# Patient Record
Sex: FEMALE | Race: WHITE | NOT HISPANIC OR LATINO | URBAN - METROPOLITAN AREA
[De-identification: names, ages, dates, MRNs, and addresses within clinical notes are randomized per-mention and may not be internally consistent; named-entity substitution may affect disease eponyms.]

---

## 2024-10-24 ENCOUNTER — HOSPITAL ENCOUNTER (EMERGENCY)
Facility: HOSPITAL | Age: 49
Discharge: OTHER INSTITUTION | End: 2024-10-24
Attending: STUDENT IN AN ORGANIZED HEALTH CARE EDUCATION/TRAINING PROGRAM
Payer: COMMERCIAL

## 2024-10-24 ENCOUNTER — APPOINTMENT (OUTPATIENT)
Dept: CARDIOLOGY | Facility: HOSPITAL | Age: 49
End: 2024-10-24
Payer: COMMERCIAL

## 2024-10-24 ENCOUNTER — HOSPITAL ENCOUNTER (INPATIENT)
Facility: HOSPITAL | Age: 49
LOS: 1 days | Discharge: HOME | End: 2024-10-25
Attending: PSYCHIATRY & NEUROLOGY | Admitting: PSYCHIATRY & NEUROLOGY
Payer: COMMERCIAL

## 2024-10-24 VITALS
HEART RATE: 97 BPM | OXYGEN SATURATION: 100 % | HEIGHT: 66 IN | SYSTOLIC BLOOD PRESSURE: 139 MMHG | TEMPERATURE: 98.4 F | DIASTOLIC BLOOD PRESSURE: 98 MMHG | WEIGHT: 200 LBS | BODY MASS INDEX: 32.14 KG/M2 | RESPIRATION RATE: 15 BRPM

## 2024-10-24 DIAGNOSIS — F22 PSYCHOSIS, PARANOID (MULTI): ICD-10-CM

## 2024-10-24 DIAGNOSIS — F31.9 BIPOLAR 1 DISORDER (MULTI): ICD-10-CM

## 2024-10-24 DIAGNOSIS — F22 PARANOID DELUSION (MULTI): Primary | ICD-10-CM

## 2024-10-24 DIAGNOSIS — R44.3 HALLUCINATIONS: ICD-10-CM

## 2024-10-24 DIAGNOSIS — N30.00 ACUTE CYSTITIS WITHOUT HEMATURIA: Primary | ICD-10-CM

## 2024-10-24 LAB
ALBUMIN SERPL BCP-MCNC: 3.8 G/DL (ref 3.4–5)
ALP SERPL-CCNC: 161 U/L (ref 33–110)
ALT SERPL W P-5'-P-CCNC: 21 U/L (ref 7–45)
AMPHETAMINES UR QL SCN: ABNORMAL
ANION GAP SERPL CALCULATED.3IONS-SCNC: 11 MMOL/L (ref 10–20)
APAP SERPL-MCNC: <10 UG/ML
APPEARANCE UR: ABNORMAL
AST SERPL W P-5'-P-CCNC: 18 U/L (ref 9–39)
B-HCG SERPL-ACNC: <2 MIU/ML
BACTERIA #/AREA URNS AUTO: ABNORMAL /HPF
BARBITURATES UR QL SCN: ABNORMAL
BASOPHILS # BLD AUTO: 0.01 X10*3/UL (ref 0–0.1)
BASOPHILS NFR BLD AUTO: 0.2 %
BENZODIAZ UR QL SCN: ABNORMAL
BILIRUB SERPL-MCNC: 0.5 MG/DL (ref 0–1.2)
BILIRUB UR STRIP.AUTO-MCNC: NEGATIVE MG/DL
BUN SERPL-MCNC: 22 MG/DL (ref 6–23)
BZE UR QL SCN: ABNORMAL
CALCIUM SERPL-MCNC: 9.1 MG/DL (ref 8.6–10.3)
CANNABINOIDS UR QL SCN: ABNORMAL
CHLORIDE SERPL-SCNC: 109 MMOL/L (ref 98–107)
CO2 SERPL-SCNC: 20 MMOL/L (ref 21–32)
COLOR UR: ABNORMAL
CREAT SERPL-MCNC: 1.17 MG/DL (ref 0.5–1.05)
EGFRCR SERPLBLD CKD-EPI 2021: 57 ML/MIN/1.73M*2
EOSINOPHIL # BLD AUTO: 0.02 X10*3/UL (ref 0–0.7)
EOSINOPHIL NFR BLD AUTO: 0.4 %
ERYTHROCYTE [DISTWIDTH] IN BLOOD BY AUTOMATED COUNT: 14.8 % (ref 11.5–14.5)
ETHANOL SERPL-MCNC: <10 MG/DL
FENTANYL+NORFENTANYL UR QL SCN: ABNORMAL
GLUCOSE BLD MANUAL STRIP-MCNC: 162 MG/DL (ref 74–99)
GLUCOSE SERPL-MCNC: 175 MG/DL (ref 74–99)
GLUCOSE UR STRIP.AUTO-MCNC: NORMAL MG/DL
HCT VFR BLD AUTO: 35.2 % (ref 36–46)
HGB BLD-MCNC: 11.5 G/DL (ref 12–16)
IMM GRANULOCYTES # BLD AUTO: 0.01 X10*3/UL (ref 0–0.7)
IMM GRANULOCYTES NFR BLD AUTO: 0.2 % (ref 0–0.9)
KETONES UR STRIP.AUTO-MCNC: NEGATIVE MG/DL
LEUKOCYTE ESTERASE UR QL STRIP.AUTO: ABNORMAL
LYMPHOCYTES # BLD AUTO: 0.85 X10*3/UL (ref 1.2–4.8)
LYMPHOCYTES NFR BLD AUTO: 17.8 %
MCH RBC QN AUTO: 27.9 PG (ref 26–34)
MCHC RBC AUTO-ENTMCNC: 32.7 G/DL (ref 32–36)
MCV RBC AUTO: 85 FL (ref 80–100)
METHADONE UR QL SCN: ABNORMAL
MONOCYTES # BLD AUTO: 0.41 X10*3/UL (ref 0.1–1)
MONOCYTES NFR BLD AUTO: 8.6 %
MUCOUS THREADS #/AREA URNS AUTO: ABNORMAL /LPF
NEUTROPHILS # BLD AUTO: 3.47 X10*3/UL (ref 1.2–7.7)
NEUTROPHILS NFR BLD AUTO: 72.8 %
NITRITE UR QL STRIP.AUTO: NEGATIVE
NRBC BLD-RTO: 0 /100 WBCS (ref 0–0)
OPIATES UR QL SCN: ABNORMAL
OXYCODONE+OXYMORPHONE UR QL SCN: ABNORMAL
PCP UR QL SCN: ABNORMAL
PH UR STRIP.AUTO: 6 [PH]
PLATELET # BLD AUTO: 178 X10*3/UL (ref 150–450)
POTASSIUM SERPL-SCNC: 3.8 MMOL/L (ref 3.5–5.3)
PROT SERPL-MCNC: 7.2 G/DL (ref 6.4–8.2)
PROT UR STRIP.AUTO-MCNC: ABNORMAL MG/DL
RBC # BLD AUTO: 4.12 X10*6/UL (ref 4–5.2)
RBC # UR STRIP.AUTO: ABNORMAL /UL
RBC #/AREA URNS AUTO: >20 /HPF
SALICYLATES SERPL-MCNC: <3 MG/DL
SODIUM SERPL-SCNC: 136 MMOL/L (ref 136–145)
SP GR UR STRIP.AUTO: 1.01
SQUAMOUS #/AREA URNS AUTO: ABNORMAL /HPF
TRICHOMONAS #/AREA UR COMP ASSIST: PRESENT /HPF
UROBILINOGEN UR STRIP.AUTO-MCNC: NORMAL MG/DL
WBC # BLD AUTO: 4.8 X10*3/UL (ref 4.4–11.3)
WBC #/AREA URNS AUTO: >50 /HPF

## 2024-10-24 PROCEDURE — 80053 COMPREHEN METABOLIC PANEL: CPT | Performed by: STUDENT IN AN ORGANIZED HEALTH CARE EDUCATION/TRAINING PROGRAM

## 2024-10-24 PROCEDURE — 80307 DRUG TEST PRSMV CHEM ANLYZR: CPT | Performed by: STUDENT IN AN ORGANIZED HEALTH CARE EDUCATION/TRAINING PROGRAM

## 2024-10-24 PROCEDURE — 81001 URINALYSIS AUTO W/SCOPE: CPT | Mod: 59 | Performed by: STUDENT IN AN ORGANIZED HEALTH CARE EDUCATION/TRAINING PROGRAM

## 2024-10-24 PROCEDURE — 96372 THER/PROPH/DIAG INJ SC/IM: CPT | Performed by: STUDENT IN AN ORGANIZED HEALTH CARE EDUCATION/TRAINING PROGRAM

## 2024-10-24 PROCEDURE — 80320 DRUG SCREEN QUANTALCOHOLS: CPT | Performed by: STUDENT IN AN ORGANIZED HEALTH CARE EDUCATION/TRAINING PROGRAM

## 2024-10-24 PROCEDURE — 93005 ELECTROCARDIOGRAM TRACING: CPT

## 2024-10-24 PROCEDURE — 2500000004 HC RX 250 GENERAL PHARMACY W/ HCPCS (ALT 636 FOR OP/ED): Performed by: STUDENT IN AN ORGANIZED HEALTH CARE EDUCATION/TRAINING PROGRAM

## 2024-10-24 PROCEDURE — 2500000001 HC RX 250 WO HCPCS SELF ADMINISTERED DRUGS (ALT 637 FOR MEDICARE OP): Performed by: INTERNAL MEDICINE

## 2024-10-24 PROCEDURE — 1240000001 HC SEMI-PRIVATE BH ROOM DAILY

## 2024-10-24 PROCEDURE — 82947 ASSAY GLUCOSE BLOOD QUANT: CPT

## 2024-10-24 PROCEDURE — 84702 CHORIONIC GONADOTROPIN TEST: CPT | Performed by: STUDENT IN AN ORGANIZED HEALTH CARE EDUCATION/TRAINING PROGRAM

## 2024-10-24 PROCEDURE — 87186 SC STD MICRODIL/AGAR DIL: CPT | Mod: TRILAB | Performed by: STUDENT IN AN ORGANIZED HEALTH CARE EDUCATION/TRAINING PROGRAM

## 2024-10-24 PROCEDURE — 36415 COLL VENOUS BLD VENIPUNCTURE: CPT | Performed by: STUDENT IN AN ORGANIZED HEALTH CARE EDUCATION/TRAINING PROGRAM

## 2024-10-24 PROCEDURE — 90839 PSYTX CRISIS INITIAL 60 MIN: CPT

## 2024-10-24 PROCEDURE — 85025 COMPLETE CBC W/AUTO DIFF WBC: CPT | Performed by: STUDENT IN AN ORGANIZED HEALTH CARE EDUCATION/TRAINING PROGRAM

## 2024-10-24 PROCEDURE — 99285 EMERGENCY DEPT VISIT HI MDM: CPT | Mod: 25

## 2024-10-24 RX ORDER — GABAPENTIN 300 MG/1
300 CAPSULE ORAL 2 TIMES DAILY
COMMUNITY
End: 2024-10-25 | Stop reason: HOSPADM

## 2024-10-24 RX ORDER — DIPHENHYDRAMINE HCL 50 MG
50 CAPSULE ORAL EVERY 6 HOURS PRN
Status: DISCONTINUED | OUTPATIENT
Start: 2024-10-24 | End: 2024-10-25 | Stop reason: HOSPADM

## 2024-10-24 RX ORDER — IBUPROFEN 200 MG
1 TABLET ORAL DAILY
Status: DISCONTINUED | OUTPATIENT
Start: 2024-12-05 | End: 2024-10-25 | Stop reason: HOSPADM

## 2024-10-24 RX ORDER — CEFUROXIME AXETIL 250 MG/1
250 TABLET ORAL 2 TIMES DAILY
Status: DISCONTINUED | OUTPATIENT
Start: 2024-10-24 | End: 2024-10-24

## 2024-10-24 RX ORDER — IBUPROFEN 600 MG/1
600 TABLET ORAL EVERY 8 HOURS PRN
Status: DISCONTINUED | OUTPATIENT
Start: 2024-10-24 | End: 2024-10-25 | Stop reason: HOSPADM

## 2024-10-24 RX ORDER — OLANZAPINE 10 MG/2ML
5 INJECTION, POWDER, FOR SOLUTION INTRAMUSCULAR EVERY 6 HOURS PRN
Status: DISCONTINUED | OUTPATIENT
Start: 2024-10-24 | End: 2024-10-25 | Stop reason: HOSPADM

## 2024-10-24 RX ORDER — SERTRALINE HYDROCHLORIDE 50 MG/1
1 TABLET, FILM COATED ORAL
COMMUNITY
Start: 2024-07-29 | End: 2024-10-25 | Stop reason: HOSPADM

## 2024-10-24 RX ORDER — DIPHENHYDRAMINE HYDROCHLORIDE 50 MG/ML
50 INJECTION INTRAMUSCULAR; INTRAVENOUS ONCE AS NEEDED
Status: DISCONTINUED | OUTPATIENT
Start: 2024-10-24 | End: 2024-10-25 | Stop reason: HOSPADM

## 2024-10-24 RX ORDER — ALUMINUM HYDROXIDE, MAGNESIUM HYDROXIDE, AND SIMETHICONE 1200; 120; 1200 MG/30ML; MG/30ML; MG/30ML
30 SUSPENSION ORAL EVERY 6 HOURS PRN
Status: DISCONTINUED | OUTPATIENT
Start: 2024-10-24 | End: 2024-10-25 | Stop reason: HOSPADM

## 2024-10-24 RX ORDER — OLANZAPINE 5 MG/1
10 TABLET ORAL EVERY 6 HOURS PRN
Status: DISCONTINUED | OUTPATIENT
Start: 2024-10-24 | End: 2024-10-25 | Stop reason: HOSPADM

## 2024-10-24 RX ORDER — OLANZAPINE 5 MG/1
5 TABLET ORAL EVERY 6 HOURS PRN
Status: DISCONTINUED | OUTPATIENT
Start: 2024-10-24 | End: 2024-10-25 | Stop reason: HOSPADM

## 2024-10-24 RX ORDER — LORAZEPAM 2 MG/ML
2 INJECTION INTRAMUSCULAR EVERY 6 HOURS PRN
Status: DISCONTINUED | OUTPATIENT
Start: 2024-10-24 | End: 2024-10-25 | Stop reason: HOSPADM

## 2024-10-24 RX ORDER — OMEPRAZOLE 40 MG/1
40 CAPSULE, DELAYED RELEASE ORAL
COMMUNITY

## 2024-10-24 RX ORDER — NICOTINE 7MG/24HR
1 PATCH, TRANSDERMAL 24 HOURS TRANSDERMAL DAILY
Status: DISCONTINUED | OUTPATIENT
Start: 2024-12-19 | End: 2024-10-25 | Stop reason: HOSPADM

## 2024-10-24 RX ORDER — MIDAZOLAM HYDROCHLORIDE 5 MG/ML
5 INJECTION INTRAMUSCULAR; INTRAVENOUS AS NEEDED
Status: COMPLETED | OUTPATIENT
Start: 2024-10-24 | End: 2024-10-24

## 2024-10-24 RX ORDER — SPIRONOLACTONE 100 MG/1
1 TABLET, FILM COATED ORAL
COMMUNITY
Start: 2024-07-29

## 2024-10-24 RX ORDER — METRONIDAZOLE 500 MG/1
2000 TABLET ORAL ONCE
Status: COMPLETED | OUTPATIENT
Start: 2024-10-24 | End: 2024-10-24

## 2024-10-24 RX ORDER — BUPROPION HYDROCHLORIDE 100 MG/1
150 TABLET ORAL EVERY MORNING
COMMUNITY
End: 2024-10-25 | Stop reason: HOSPADM

## 2024-10-24 RX ORDER — DROPERIDOL 2.5 MG/ML
5 INJECTION, SOLUTION INTRAMUSCULAR; INTRAVENOUS AS NEEDED
Status: DISCONTINUED | OUTPATIENT
Start: 2024-10-24 | End: 2024-10-24 | Stop reason: HOSPADM

## 2024-10-24 RX ORDER — CEFUROXIME AXETIL 500 MG/1
500 TABLET ORAL 2 TIMES DAILY
Status: DISCONTINUED | OUTPATIENT
Start: 2024-10-24 | End: 2024-10-25 | Stop reason: HOSPADM

## 2024-10-24 RX ORDER — LORAZEPAM 1 MG/1
2 TABLET ORAL EVERY 6 HOURS PRN
Status: DISCONTINUED | OUTPATIENT
Start: 2024-10-24 | End: 2024-10-25 | Stop reason: HOSPADM

## 2024-10-24 RX ORDER — IBUPROFEN 200 MG
1 TABLET ORAL DAILY
Status: DISCONTINUED | OUTPATIENT
Start: 2024-10-24 | End: 2024-10-25 | Stop reason: HOSPADM

## 2024-10-24 RX ORDER — OLANZAPINE 10 MG/2ML
10 INJECTION, POWDER, FOR SOLUTION INTRAMUSCULAR EVERY 6 HOURS PRN
Status: DISCONTINUED | OUTPATIENT
Start: 2024-10-24 | End: 2024-10-25 | Stop reason: HOSPADM

## 2024-10-24 RX ORDER — TALC
3 POWDER (GRAM) TOPICAL NIGHTLY PRN
Status: DISCONTINUED | OUTPATIENT
Start: 2024-10-24 | End: 2024-10-25 | Stop reason: HOSPADM

## 2024-10-24 RX ADMIN — CEFUROXIME AXETIL 500 MG: 500 TABLET ORAL at 22:50

## 2024-10-24 RX ADMIN — METRONIDAZOLE 2000 MG: 500 TABLET ORAL at 22:50

## 2024-10-24 SDOH — HEALTH STABILITY: MENTAL HEALTH: NON-SPECIFIC ACTIVE SUICIDAL THOUGHTS (PAST 1 MONTH): NO

## 2024-10-24 SDOH — HEALTH STABILITY: MENTAL HEALTH: ANXIETY SYMPTOMS: NO PROBLEMS REPORTED OR OBSERVED.

## 2024-10-24 SDOH — HEALTH STABILITY: MENTAL HEALTH: WISH TO BE DEAD (PAST 1 MONTH): NO

## 2024-10-24 SDOH — HEALTH STABILITY: MENTAL HEALTH: IN THE PAST FEW WEEKS, HAVE YOU WISHED YOU WERE DEAD?: NO

## 2024-10-24 SDOH — HEALTH STABILITY: MENTAL HEALTH: ARE YOU HAVING THOUGHTS OF KILLING YOURSELF RIGHT NOW?: NO

## 2024-10-24 SDOH — HEALTH STABILITY: MENTAL HEALTH: HAVE YOU EVER TRIED TO KILL YOURSELF?: NO

## 2024-10-24 SDOH — HEALTH STABILITY: MENTAL HEALTH: IN THE PAST WEEK, HAVE YOU BEEN HAVING THOUGHTS ABOUT KILLING YOURSELF?: NO

## 2024-10-24 SDOH — ECONOMIC STABILITY: HOUSING INSECURITY: FEELS SAFE LIVING IN HOME: YES

## 2024-10-24 SDOH — HEALTH STABILITY: MENTAL HEALTH: DEPRESSION SYMPTOMS: NO PROBLEMS REPORTED OR OBSERVED.

## 2024-10-24 SDOH — HEALTH STABILITY: MENTAL HEALTH: IN THE PAST FEW WEEKS, HAVE YOU FELT THAT YOU OR YOUR FAMILY WOULD BE BETTER OFF IF YOU WERE DEAD?: NO

## 2024-10-24 SDOH — HEALTH STABILITY: MENTAL HEALTH: SUICIDAL BEHAVIOR (LIFETIME): NO

## 2024-10-24 ASSESSMENT — PAIN SCALES - GENERAL
PAINLEVEL_OUTOF10: 0 - NO PAIN
PAINLEVEL_OUTOF10: 0 - NO PAIN

## 2024-10-24 ASSESSMENT — PAIN - FUNCTIONAL ASSESSMENT
PAIN_FUNCTIONAL_ASSESSMENT: 0-10
PAIN_FUNCTIONAL_ASSESSMENT: 0-10

## 2024-10-24 ASSESSMENT — COLUMBIA-SUICIDE SEVERITY RATING SCALE - C-SSRS
1. SINCE LAST CONTACT, HAVE YOU WISHED YOU WERE DEAD OR WISHED YOU COULD GO TO SLEEP AND NOT WAKE UP?: NO
6. HAVE YOU EVER DONE ANYTHING, STARTED TO DO ANYTHING, OR PREPARED TO DO ANYTHING TO END YOUR LIFE?: NO
2. HAVE YOU ACTUALLY HAD ANY THOUGHTS OF KILLING YOURSELF?: NO

## 2024-10-24 ASSESSMENT — LIFESTYLE VARIABLES
SUBSTANCE_ABUSE_PAST_12_MONTHS: YES
PRESCIPTION_ABUSE_PAST_12_MONTHS: NO

## 2024-10-24 NOTE — PROGRESS NOTES
Social Work Note    FTF w/patient for meet and greet.  Pt BIB EMS via pink slip as she called police 3x that evening reporting various   domestic disturbances. Pt appeared to be hallucinating. She was aggressive and could not be redirected by   ED staff. Pt reported no SI/HI and no previous hx of mh issues or inpatient hospitalizations. SW reviewed med   Chart which confirms this.  Pt was medicated due to excessive agitation. When SW encountered pt, she appeared calm   though drowsy. SW quickly explained the ED process and that she would be assessed when medically cleared.   Pt nodded her head expressing understanding and requested additional blankets. SW to follow up during first  Shift.

## 2024-10-24 NOTE — PROGRESS NOTES
"EPAT - Social Work Psychiatric Assessment    Arrival Details  Mode of Arrival:  ([facility])  Admission Source: Home  Admission Type: Involuntary (pink slip by [facility])  EPAT Assessment Start Date: 10/24/24  EPAT Assessment Start Time: 1102  Name of : DIONISIO Gallardo    History of Present Illness  Admission Reason: Pt is a 50y/o female presenting to [facility] ED for hallucinations and paranoia.  HPI: Pt chart, triage and provider notes reviewed prior to assessment, triage assessment reflects \"no risk indicated\". Pt BIB [facility] last night after calling them several times claiming there are people in the walls, ceiling and floors of her home. Pt also believes her phone was hacked. Pt admits to using speed but denies ever having hallucinations as a result of her use. Pt denies SI/HI. Pt is active with providers at [facility] but denies medication use.    SW Readmission Information   Readmission within 30 Days: No    Psychiatric Symptoms  Anxiety Symptoms: No problems reported or observed.  Depression Symptoms: No problems reported or observed.  Bri Symptoms: No problems reported or observed.    Psychosis Symptoms  Hallucination Type: Auditory, Visual  Delusion Type: Paranoid    Additional Symptoms - Adult  Generalized Anxiety Disorder: No problems reported or observed.  Obsessive Compulsive Disorder: No problems reported or observed.  Panic Attack: No problems reported or observed.  Post Traumatic Stress Disorder: No problems reported or observed.  Delirium: No problems reported or observed.    Past Psychiatric History/Meds/Treatments  Past Psychiatric History: Pt reports history of MDD, CLEMENTE, ADHD and prolonged grief. Pt also abusing stimulants for several months.  Past Psychiatric Meds/Treatments: Pt denies medication use. Pt reports seeing a counselor and psychiatrist with [facility]. Pt denies inpatient admissions in the past.  Past Violence/Victimization History: Denies    Current Mental Health Contacts  Case " Manager Name/Phone Number: N/A  Provider Name/Phone Number: Kathryn Garyffer (counselor)    Support System:  (unable to assess)    Living Arrangement: Lives with someone (pt reports living with a man who intends to buy her condo)    Home Safety  Feels Safe Living in Home: Yes  Potentially Unsafe Housing Conditions: Unable to Assess  Home Safety : No concerns reported.    Income Information  Employment Status for: Patient  Employment Status: Unemployed              Legal  Legal Considerations: Patient/ Family Ability to Make Healthcare Decisions  Criminal Activity/ Legal Involvement Pertinent to Current Situation/ Hospitalization: None reported  Legal Concerns: None reported    Drug Screening  Have you used any substances (canabis, cocaine, heroin, hallucinogens, inhalants, etc.) in the past 12 months?: Yes (amphetamines)  Have you used any prescription drugs other than prescribed in the past 12 months?: No  Is a toxicology screen needed?: Yes (tox positive for amphetamines and benzos, pt reportedly provided benzos in ED)    Stage of Change  Stage of Change: Precontemplation  History of Treatment:  (None)  Treatment Offered: Declined    Behavioral Health  Behavioral Health(WDL): Exceptions to WDL  Behaviors/Mood: Calm, Labile, Paranoid  Affect: Appropriate to circumstances  Parent/Guardian/Significant Other Involvement: No involvement    Orientation  Orientation Level: Oriented X4    General Appearance  Motor Activity: Unremarkable  General Attitude: Attentive, Defensive  Appearance/Hygiene: Body odor, Disheveled, Excess makeup, Torn clothes    Thought Process  Coherency: Loose associations, Tangential  Content: Delusions  Delusions: Paranoid  Perception: Not altered  Hallucination: None  Judgment/Insight: Impaired  Confusion: None  Cognition: Appropriate attention/concentration, No long term memory loss, No short term memory loss, Poor judgement, Appropriate safety awareness    Sleep Pattern  Sleep Pattern:  "Insomnia    Risk Factors  Self Harm/Suicidal Ideation Plan: Pt denies  Previous Self Harm/Suicidal Plans: Pt reports SI \"one time in my life\" in July of 202. Denies plan, intent or attempts at that time.  Risk Factors: Substance abuse, Presence of firearms in home, Unemployment  Description of Thoughts/Ideas Leaving Unit Now: Denies    Violence Risk Assessment  Assessment of Violence: None noted  Thoughts of Harm to Others: No    Ability to Assess Risk Screen  Risk Screen - Ability to Assess: Able to be screened  Ask Suicide-Screening Questions  1. In the past few weeks, have you wished you were dead?: No  2. In the past few weeks, have you felt that you or your family would be better off if you were dead?: No  3. In the past week, have you been having thoughts about killing yourself?: No  4. Have you ever tried to kill yourself?: No  5. Are you having thoughts of killing yourself right now?: No  Calculated Risk Score: No intervention is necessary  [place] Suicide Severity Rating Scale (Screener/Recent Self-Report)  1. Wish to be Dead (Past 1 Month): No  2. Non-Specific Active Suicidal Thoughts (Past 1 Month): No  6. Suicidal Behavior (Lifetime): No  Calculated C-SSRS Risk Score (Lifetime/Recent): No Risk Indicated  Step 1: Risk Factors  Current & Past Psychiatric Dx: Mood disorder, ADHD, Psychotic disorder  Presenting Symptoms: Psychosis  Precipitants/Stressors: Substance intoxication or withdrawal  Access to Lethal Methods : Yes (Pt has a gun in the home.)  Step 2: Protective Factors   Protective Factors Internal: Identifies reasons for living  Protective Factors External: Responsibility to children, Positive therapeutic relationships, Supportive social network or family or friends  Step 5: Documentation  Risk Level: Low suicide risk    Psychiatric Impression and Plan of Care  Assessment and Plan: Met [facility] w/ pt for assessment. Pt presents calm, A&Ox4 with appropriate affect. Pt BIB [facility] last night after frequent " "calls regarding people in her house. Per pt, she was also on the phone with her ex  who called them for a welfare check because he suspected she was hallucinating. Pt told PD there was people in the walls, floors and ceilings of her home. During assessment, pt continues to endorse that there are people inside of her home. Pt reports a woman was at her house who \"hid a small person in a stroller\" and brought them into her home. Pt also belives her phone was hacked. Pt reports this is because she took videos of the people in her home and \"they\" hacked into her phone to delete the videos. Pt speech is tangential and she responds with loose associations. Pt is also labile with rapid shifts between crying and euthymic. Pt reports history of MDD, CLEMENTE, prolonged grief and a new diagnosis of ADHD. Pt denies using medication but sees a counselor and psychiatrist at [facility]. Pt admits to using speed a few times per week for less than one year. Pt denies that speed has ever made her hallucinate. Pt denies SI/HI. Pt is not able to care for self due to mental status and requires inpatient admission for safety and stabilization.  Specific Resources Provided to Patient: Peer support not available.  CM Notified: N/A  PHP/IOP Recommended: N/A  Specific Information Provided for PHP/IOP: N/A  Plan Comments: Diagnostic impression: unspecified psychosis    Outcome/Disposition  Patient's Perception of Outcome Achieved: pt does not agree but is cooperative  Assessment, Recommendations and Risk Level Reviewed with: Dr Heard  Contact Name: N/A  Contact Number(s): N/A  EPAT Assessment Completed Date: 10/24/24  EPAT Assessment Completed Time: 1133    Pt accepted to [facility] by Dr Nava. Pink slip addressed, copy faxed, clinicals given to RN for report.       "

## 2024-10-24 NOTE — LETTER
The following plan is in draft form.  Please refer to the current version for the most up-to-date information.                Inpatient Treatment Plan 10/25/24   Effective from: 10/25/2024    Draft  Plan ID: [id number]               Participants as of 10/25/2024      Name Type Comments Contact Info    John Jara, RN Charge Nurse      Norah Nava MD Attending Provider  [phone]    Meenu Chinchilla Patient  [phone]          Patient Demographics       Patient Name  Meenu Chinchilla Legal Sex  Female   1975 SSN  [id number] Address  [address]   [place] [id number] Phone  [phone] (Mobile) *Preferred*          Patient Strengths and Barriers       None          Current Problems             Noted    Psychosis, paranoid (Multi) 10/24/2024     Current Medications         Start End    buPROPion (Wellbutrin) 100 mg tablet  --    Sig: Take 1.5 tablets (150 mg) by mouth once daily in the morning. Take 1 tablet PO QD    Class: Historical Med    Route: oral    gabapentin (Neurontin) 300 mg capsule  --    Sig: Take 1 capsule (300 mg) by mouth 2 times a day. Take 300mg in AM and 300mg at HS.    Class: Historical Med    Route: oral    omeprazole (PriLOSEC) 40 mg DR capsule  --    Sig: Take 1 capsule (40 mg) by mouth once daily in the morning. Take before meals.    Class: Historical Med    Route: oral    sertraline (Zoloft) 50 mg tablet 2024 --    Sig: Take 1 tablet (50 mg) by mouth early in the morning..    Class: Historical Med    Route: oral    spironolactone (Aldactone) 100 mg tablet 2024 --    Sig: Take 1 tablet (100 mg) by mouth early in the morning..    Class: Historical Med    Route: oral          Hospital Medications         Dose Frequency Start End    alum-mag hydroxide-simeth (Mylanta) 200-200-20 mg/5 mL oral suspension 30 mL 30 mL Every 6 hours PRN 10/24/2024 --    Route: oral    cefuroxime (Ceftin) tablet 500 mg 500 mg 2 times daily 10/24/2024 10/29/2024    Route: oral  "   diphenhydrAMINE (BENADryl) capsule 50 mg 50 mg Every 6 hours PRN 10/24/2024 --    Route: oral    Linked Group 1: Placed in \"Or\" Linked Group        diphenhydrAMINE (BENADryl) injection 50 mg 50 mg Once as needed 10/24/2024 --    Admin Instructions: Use if patient is unable to take oral.    Route: intramuscular    Linked Group 1: Placed in \"Or\" Linked Group        ibuprofen tablet 600 mg 600 mg Every 8 hours PRN 10/24/2024 --    Admin Instructions: May administer with food to reduce GI upset.    Route: oral    LORazepam (Ativan) injection 2 mg 2 mg Every 6 hours PRN 10/24/2024 --    Admin Instructions: Give along with antipsychotic for additional calming effect. Use if patient is unable to take oral. Do not give within 1 hour of olanzapine.    Route: intramuscular    Linked Group 2: Placed in \"Or\" Linked Group        LORazepam (Ativan) tablet 2 mg 2 mg Every 6 hours PRN 10/24/2024 --    Admin Instructions: Give along with antipsychotic for additional calming effect. Do not give within 1 hour of olanzapine.    Route: oral    Linked Group 2: Placed in \"Or\" Linked Group        melatonin tablet 3 mg 3 mg Nightly PRN 10/24/2024 --    Route: oral    metroNIDAZOLE (Flagyl) tablet 2,000 mg (Completed) 2,000 mg Once 10/24/2024 10/24/2024    Admin Instructions: Administer with food or snack. Do NOT give with alcohol or drug products with significant alcohol content.    Route: oral    midazolam (Versed) injection 5 mg (Completed) 5 mg As needed 10/24/2024 10/24/2024    Route: intramuscular    nicotine (Nicoderm CQ) 14 mg/24 hr patch 1 patch 1 patch Daily 12/5/2024 12/19/2024    Route: transdermal    Linked Group 3: Placed in \"Followed by\" Linked Group        nicotine (Nicoderm CQ) 21 mg/24 hr patch 1 patch 1 patch Daily 10/24/2024 12/5/2024    Route: transdermal    Linked Group 3: Placed in \"Followed by\" Linked Group        nicotine (Nicoderm CQ) 7 mg/24 hr patch 1 patch 1 patch Daily 12/19/2024 1/2/2025    Route: " "transdermal    Linked Group 3: Placed in \"Followed by\" Linked Group        OLANZapine (ZyPREXA) injection 10 mg 10 mg Every 6 hours PRN 10/24/2024 --    Admin Instructions: Use if patient is unable to take oral.  Notify physician if cumulative dose exceeds 30 mg per 24 hour period.  Each 10 mg vial to be reconstituted with 2.1 mL sterile water for injection to give a concentration of approx 5 mg/mL. Use immediately. Discard unused portion.    Route: intramuscular    Linked Group 4: Placed in \"Or\" Linked Group        OLANZapine (ZyPREXA) injection 5 mg 5 mg Every 6 hours PRN 10/24/2024 --    Admin Instructions: Use if patient is unable to take oral.  Notify physician if cumulative dose exceeds 30 mg per 24 hour period.  Each 10 mg vial to be reconstituted with 2.1 mL sterile water for injection to give a concentration of approx 5 mg/mL. Use immediately. Discard unused portion.    Route: intramuscular    Linked Group 5: Placed in \"Or\" Linked Group        OLANZapine (ZyPREXA) tablet 10 mg 10 mg Every 6 hours PRN 10/24/2024 --    Admin Instructions: Notify physician if cumulative dose exceeds 30 mg per 24 hour period.    Route: oral    Linked Group 4: Placed in \"Or\" Linked Group        OLANZapine (ZyPREXA) tablet 5 mg 5 mg Every 6 hours PRN 10/24/2024 --    Admin Instructions: Notify physician if cumulative dose exceeds 30 mg per 24 hour period.    Route: oral    Linked Group 5: Placed in \"Or\" Linked Group        cefuroxime (Ceftin) tablet 250 mg (Discontinued) 250 mg 2 times daily 10/24/2024 10/24/2024    Route: oral    droperidol (Inapsine) injection 5 mg (Discontinued) 5 mg As needed 10/24/2024 10/24/2024    Admin Instructions: 12-Lead ECG prior to administration and 10-30 minutes after completion for QTc prolongation risk.    Route: intramuscular    Reason for Discontinue: Patient Discharge          Discharge Planning      Flowsheet Row Most Recent Value   Who is requesting discharge planning? Provider          Care " Plan (Active)       Problem: Alteration in Sleep       Dates: Start:  10/25/24       Disciplines: Interdisciplinary      Goal: STG - Reports nightly sleep, duration, and quality       Dates: Start:  10/25/24    Expected End:  10/31/24       Description: INTERVENTIONS:  1. Document duration, quality of sleep, and reasons for lack    Disciplines: Interdisciplinary      Outcomes       Date/Time User Outcome    10/25/24 0210 John Jara RN Not Progressing            Goal: STG - Identifies sleep hygiene aids       Dates: Start:  10/25/24    Expected End:  10/31/24       Description: INTERVENTIONS:  1. Promote identification & development of sleep hygiene program    Disciplines: Interdisciplinary      Outcomes       Date/Time User Outcome    10/25/24 0210 John Jara RN Not Progressing            Goal: STG - Informs staff if unable to sleep       Dates: Start:  10/25/24    Expected End:  10/31/24       Description: INTERVENTIONS:  1. Encourage patient to inform staff if awake at rounds    Disciplines: Interdisciplinary      Outcomes       Date/Time User Outcome    10/25/24 0210 John Jara RN Not Progressing            Goal: STG - Attends breathing and relaxation group       Dates: Start:  10/25/24    Expected End:  10/31/24       Description: INTERVENTIONS:  1. Encourage attendance at breathing and relaxation group nightly    Disciplines: Interdisciplinary      Outcomes       Date/Time User Outcome    10/25/24 0210 John Jara RN Not Progressing               Problem: Altered Thought Processes as Evidenced by       Dates: Start:  10/25/24       Disciplines: Interdisciplinary      Goal: STG - Desires improvement in ability to think and concentrate       Dates: Start:  10/25/24    Expected End:  10/31/24       Disciplines: Interdisciplinary      Outcomes       Date/Time User Outcome    10/25/24 0210 John Jara RN Not Progressing            Goal: STG - Participates in Occupational Therapy  and other cognitive assessments       Dates: Start:  10/25/24    Expected End:  10/31/24       Description: INTERVENTIONS:  1. Assess concentration and thinking in groups  2. Assess in 1 to 1 meetings    Disciplines: Interdisciplinary      Outcomes       Date/Time User Outcome    10/25/24 0210 John Jara RN Not Progressing               Problem: Anxiety       Dates: Start:  10/25/24       Disciplines: Interdisciplinary      Goal: Patient/family understands admission protocols       Dates: Start:  10/25/24    Expected End:  10/31/24       Description: INTERVENTIONS:  1. Explain admission protocols    Disciplines: Interdisciplinary      Outcomes       Date/Time User Outcome    10/25/24 0210 John Jara RN Not Progressing            Goal: Attempts to manage anxiety with help       Dates: Start:  10/25/24    Expected End:  10/31/24       Disciplines: Interdisciplinary      Outcomes       Date/Time User Outcome    10/25/24 0210 John Jara RN Not Progressing            Goal: Verbalizes ways to manage anxiety       Dates: Start:  10/25/24    Expected End:  10/31/24       Disciplines: Interdisciplinary      Outcomes       Date/Time User Outcome    10/25/24 0210 John Jara RN Not Progressing            Goal: Implements measures to reduce anxiety       Dates: Start:  10/25/24    Expected End:  10/31/24       Disciplines: Interdisciplinary      Outcomes       Date/Time User Outcome    10/25/24 0210 John Jara RN Not Progressing            Goal: Free from restraint events       Dates: Start:  10/25/24    Expected End:  10/31/24       Description: INTERVENTIONS:  1. Utilize least restrictive measures  2. Provide behavioral interventions  3. Redirect inappropriate behaviors    Disciplines: Interdisciplinary      Outcomes       Date/Time User Outcome    10/25/24 0210 John Jara RN Not Progressing               Problem: Defensive Coping       Dates: Start:  10/25/24       Disciplines:  Interdisciplinary      Goal: Cooperates with admission process       Dates: Start:  10/25/24    Expected End:  10/31/24       Description: INTERVENTIONS:  1. Complete admission process    Disciplines: Interdisciplinary      Outcomes       Date/Time User Outcome    10/25/24 0210 John Jara RN Not Progressing            Goal: Identifies reckless/dangerous behavior       Dates: Start:  10/25/24    Expected End:  10/31/24       Disciplines: Interdisciplinary      Outcomes       Date/Time User Outcome    10/25/24 0210 John Jara RN Not Progressing            Goal: Identifies stressors that lead to reckless/dangerous behavior       Dates: Start:  10/25/24    Expected End:  10/31/24       Disciplines: Interdisciplinary      Outcomes       Date/Time User Outcome    10/25/24 0210 John Jara RN Not Progressing            Goal: Discusses and identifies healthy coping skills       Dates: Start:  10/25/24    Expected End:  10/31/24       Disciplines: Interdisciplinary      Outcomes       Date/Time User Outcome    10/25/24 0210 John Jara RN Not Progressing            Goal: Demonstrates healthy coping skills       Dates: Start:  10/25/24    Expected End:  10/31/24       Disciplines: Interdisciplinary      Outcomes       Date/Time User Outcome    10/25/24 0210 John Jara RN Not Progressing            Goal: Identifies appropriate social interaction       Dates: Start:  10/25/24    Expected End:  10/31/24       Disciplines: Interdisciplinary      Outcomes       Date/Time User Outcome    10/25/24 0210 John Jara RN Not Progressing            Goal: Demonstrates appropriate social interactions       Dates: Start:  10/25/24    Expected End:  10/31/24       Disciplines: Interdisciplinary      Outcomes       Date/Time User Outcome    10/25/24 0210 John Jara RN Not Progressing            Goal: Patient/Family verbalizes awareness of resources       Dates: Start:  10/25/24    Expected  End:  10/31/24       Disciplines: Interdisciplinary      Outcomes       Date/Time User Outcome    10/25/24 0210 John Jara RN Not Progressing            Goal: Discusses signs/symptoms of illness/treatment options       Dates: Start:  10/25/24    Expected End:  10/31/24       Description: INTERVENTIONS:  1. Provide therapeutic environment  2. Complete daily flowsheet  3. Provide required programming    Disciplines: Interdisciplinary      Outcomes       Date/Time User Outcome    10/25/24 0210 John Jara RN Not Progressing            Goal: Patient/Family participate in treatment and discharge plans       Dates: Start:  10/25/24    Expected End:  10/31/24       Description: INTERVENTIONS:  1. Complete daily flowsheet  2. Provide therapeutic environment    Disciplines: Interdisciplinary      Outcomes       Date/Time User Outcome    10/25/24 0210 John Jara RN Not Progressing            Goal: Understands least restrictive measures       Dates: Start:  10/25/24    Expected End:  10/31/24       Description: INTERVENTIONS:  1. Utilize least restrictive measures    Disciplines: Interdisciplinary      Outcomes       Date/Time User Outcome    10/25/24 0210 John Jara RN Not Progressing            Goal: Free from restraint events       Dates: Start:  10/25/24    Expected End:  10/31/24       Description: INTERVENTIONS:  1. Utilize least restrictive measures  2. Provide behavioral interventions  3. Redirect inappropriate behaviors    Disciplines: Interdisciplinary      Outcomes       Date/Time User Outcome    10/25/24 0210 John Jara RN Not Progressing               Problem: Educational/Scholastic Disruption       Dates: Start:  10/25/24       Disciplines: Interdisciplinary      Goal: Meets educational requirements during hospitalization       Dates: Start:  10/25/24    Expected End:  10/31/24       Disciplines: Interdisciplinary      Outcomes       Date/Time User Outcome    10/25/24 0210  John Jara RN Not Progressing            Goal: Attends class without disruptive behavior       Dates: Start:  10/25/24    Expected End:  10/31/24       Disciplines: Interdisciplinary      Outcomes       Date/Time User Outcome    10/25/24 0210 John Jara RN Not Progressing            Goal: Completes daily assignments       Dates: Start:  10/25/24    Expected End:  10/31/24       Disciplines: Interdisciplinary      Outcomes       Date/Time User Outcome    10/25/24 0210 John Jara RN Not Progressing               Problem: Fall/Injury       Dates: Start:  10/25/24       Disciplines: Interdisciplinary      Goal: Not fall by end of shift       Dates: Start:  10/25/24    Expected End:  10/31/24       Disciplines: Interdisciplinary      Outcomes       Date/Time User Outcome    10/25/24 0210 John Jara RN Not Progressing            Goal: Be free from injury by end of the shift       Dates: Start:  10/25/24    Expected End:  10/31/24       Disciplines: Interdisciplinary      Outcomes       Date/Time User Outcome    10/25/24 0210 John Jara RN Not Progressing            Goal: Verbalize understanding of personal risk factors for fall in the hospital       Dates: Start:  10/25/24    Expected End:  10/31/24       Disciplines: Interdisciplinary      Outcomes       Date/Time User Outcome    10/25/24 0210 John Jara RN Not Progressing            Goal: Verbalize understanding of risk factor reduction measures to prevent injury from fall in the home       Dates: Start:  10/25/24    Expected End:  10/31/24       Disciplines: Interdisciplinary      Outcomes       Date/Time User Outcome    10/25/24 0210 John Jara RN Not Progressing            Goal: Pace activities to prevent fatigue by end of the shift       Dates: Start:  10/25/24    Expected End:  10/31/24       Disciplines: Interdisciplinary      Outcomes       Date/Time User Outcome    10/25/24 0210 John Jara RN Not  Progressing               Problem: Ineffective Coping       Dates: Start:  10/25/24       Disciplines: Interdisciplinary      Goal: Cooperates with admission process       Dates: Start:  10/25/24    Expected End:  10/31/24       Description: INTERVENTIONS:  1. Complete admission process    Disciplines: Interdisciplinary      Outcomes       Date/Time User Outcome    10/25/24 0210 John Jara RN Not Progressing            Goal: Identifies ineffective coping skills       Dates: Start:  10/25/24    Expected End:  10/31/24       Disciplines: Interdisciplinary      Outcomes       Date/Time User Outcome    10/25/24 0210 John Jara RN Not Progressing            Goal: Identifies healthy coping skills       Dates: Start:  10/25/24    Expected End:  10/31/24       Disciplines: Interdisciplinary      Outcomes       Date/Time User Outcome    10/25/24 0210 John Jara RN Not Progressing            Goal: Demonstrates healthy coping skills       Dates: Start:  10/25/24    Expected End:  10/31/24       Disciplines: Interdisciplinary      Outcomes       Date/Time User Outcome    10/25/24 0210 John Jara RN Not Progressing            Goal: Participates in unit activities       Dates: Start:  10/25/24    Expected End:  10/31/24       Description: INTERVENTIONS:  1. Provide therapeutic environment  2. Provide required programming  3. Redirect inappropriate behaviors    Disciplines: Interdisciplinary      Outcomes       Date/Time User Outcome    10/25/24 0210 John Jara RN Not Progressing            Goal: Patient/Family participate in treatment and discharge plans       Dates: Start:  10/25/24    Expected End:  10/31/24       Description: INTERVENTIONS:  1. Complete daily flowsheet  2. Provide therapeutic environment    Disciplines: Interdisciplinary      Outcomes       Date/Time User Outcome    10/25/24 0210 John Jara RN Not Progressing            Goal: Patient/Family verbalizes awareness  of resources       Dates: Start:  10/25/24    Expected End:  10/31/24       Disciplines: Interdisciplinary      Outcomes       Date/Time User Outcome    10/25/24 0210 John Jara RN Not Progressing            Goal: Understands least restrictive measures       Dates: Start:  10/25/24    Expected End:  10/31/24       Description: INTERVENTIONS:  1. Utilize least restrictive measures    Disciplines: Interdisciplinary      Outcomes       Date/Time User Outcome    10/25/24 0210 John Jara RN Not Progressing            Goal: Free from restraint events       Dates: Start:  10/25/24    Expected End:  10/31/24       Description: INTERVENTIONS:  1. Utilize least restrictive measures  2. Provide behavioral interventions  3. Redirect inappropriate behaviors    Disciplines: Interdisciplinary      Outcomes       Date/Time User Outcome    10/25/24 0210 John Jara RN Not Progressing               Problem: Potential for Harm to Self or Others       Dates: Start:  10/25/24       Disciplines: Interdisciplinary      Goal: Cooperates with admission process       Dates: Start:  10/25/24    Expected End:  10/31/24       Description: INTERVENTIONS:  1. Complete admission process    Disciplines: Interdisciplinary      Outcomes       Date/Time User Outcome    10/25/24 0210 John Jara RN Not Progressing            Goal: Participates in unit activities       Dates: Start:  10/25/24    Expected End:  10/31/24       Description: INTERVENTIONS:  1. Provide therapeutic environment  2. Provide required programming  3. Redirect inappropriate behaviors    Disciplines: Interdisciplinary      Outcomes       Date/Time User Outcome    10/25/24 0210 John Jara RN Not Progressing            Goal: Patient/Family participate in treatment and discharge plans       Dates: Start:  10/25/24    Expected End:  10/31/24       Description: INTERVENTIONS:  1. Complete daily flowsheet  2. Provide therapeutic environment     Disciplines: Interdisciplinary      Outcomes       Date/Time User Outcome    10/25/24 0210 John Jara RN Not Progressing            Goal: Identifies deescalation techniques       Dates: Start:  10/25/24    Expected End:  10/31/24       Disciplines: Interdisciplinary      Outcomes       Date/Time User Outcome    10/25/24 0210 John Jara RN Not Progressing            Goal: Understands least restrictive measures       Dates: Start:  10/25/24    Expected End:  10/31/24       Description: INTERVENTIONS:  1. Utilize least restrictive measures    Disciplines: Interdisciplinary      Outcomes       Date/Time User Outcome    10/25/24 0210 John Jara RN Not Progressing            Goal: Identifies stressors that lead to harmful behaviors       Dates: Start:  10/25/24    Expected End:  10/31/24       Disciplines: Interdisciplinary      Outcomes       Date/Time User Outcome    10/25/24 0210 John Jara RN Not Progressing            Goal: Notifies staff when experiencing harmful thoughts toward self/others       Dates: Start:  10/25/24    Expected End:  10/31/24       Disciplines: Interdisciplinary      Outcomes       Date/Time User Outcome    10/25/24 0210 John Jara RN Not Progressing            Goal: Denies harm toward self or others       Dates: Start:  10/25/24    Expected End:  10/31/24       Disciplines: Interdisciplinary      Outcomes       Date/Time User Outcome    10/25/24 0210 John Jara RN Not Progressing            Goal: Free from restraint events       Dates: Start:  10/25/24    Expected End:  10/31/24       Description: INTERVENTIONS:  1. Utilize least restrictive measures  2. Provide behavioral interventions  3. Redirect inappropriate behaviors    Disciplines: Interdisciplinary      Outcomes       Date/Time User Outcome    10/25/24 0210 John Jara RN Not Progressing               Problem: Potential for Substance Withdrawal       Dates: Start:  10/25/24        Disciplines: Interdisciplinary      Goal: Verbalizes signs/symptoms of withdrawal       Dates: Start:  10/25/24    Expected End:  10/31/24       Disciplines: Interdisciplinary      Outcomes       Date/Time User Outcome    10/25/24 0210 John Jara RN Not Progressing            Goal: Reports signs/symptoms of withdrawal       Dates: Start:  10/25/24    Expected End:  10/31/24       Disciplines: Interdisciplinary      Outcomes       Date/Time User Outcome    10/25/24 0210 John Jara RN Not Progressing            Goal: Free of withdrawal symptoms       Dates: Start:  10/25/24    Expected End:  10/31/24       Disciplines: Interdisciplinary      Outcomes       Date/Time User Outcome    10/25/24 0210 John Jara RN Not Progressing               Problem: Self Care Deficit       Dates: Start:  10/25/24       Disciplines: Interdisciplinary      Goal: STG - Patient completes hygiene       Dates: Start:  10/25/24    Expected End:  10/31/24       Description: INTERVENTIONS:  1. Encourage/perform oral hygiene as appropriate    Disciplines: Interdisciplinary      Outcomes       Date/Time User Outcome    10/25/24 0210 John Jara RN Not Progressing            Goal: Increase group attendance       Dates: Start:  10/25/24    Expected End:  10/31/24       Description: INTERVENTIONS:  1. Provide group schedule and encourage to attend  2. Monitor and document participation    Disciplines: Interdisciplinary      Outcomes       Date/Time User Outcome    10/25/24 0210 John Jara RN Not Progressing            Goal: Accepts need for medications       Dates: Start:  10/25/24    Expected End:  10/31/24       Description: INTERVENTIONS:  1. Educate on medication scheduled times    Disciplines: Interdisciplinary      Outcomes       Date/Time User Outcome    10/25/24 0210 John Jara RN Not Progressing            Goal: STG - Goes to and eats meals independently in dining room 100% of time       Dates:  Start:  10/25/24    Expected End:  10/31/24       Description: INTERVENTIONS:  1. [place] patient to unit/surroundings    Disciplines: Interdisciplinary      Outcomes       Date/Time User Outcome    10/25/24 0210 John Jara RN Not Progressing               Problem: Sensory Perceptual Alteration as Evidenced by       Dates: Start:  10/25/24       Description: - auditory/visual hallucinations  - posturing  - restlessness  - pacing  - increasing agitation  - aggression    Disciplines: Interdisciplinary      Goal: Cooperates with admission process       Dates: Start:  10/25/24    Expected End:  10/31/24       Description: INTERVENTIONS:  1. Complete admission process    Disciplines: Interdisciplinary      Outcomes       Date/Time User Outcome    10/25/24 0210 John Jara RN Not Progressing            Goal: Patient/Family participate in treatment and discharge plans       Dates: Start:  10/25/24    Expected End:  10/31/24       Description: INTERVENTIONS:  1. Complete daily flowsheet  2. Provide therapeutic environment    Disciplines: Interdisciplinary      Outcomes       Date/Time User Outcome    10/25/24 0210 John Jara RN Not Progressing            Goal: Patient/Family verbalizes awareness of resources       Dates: Start:  10/25/24    Expected End:  10/31/24       Disciplines: Interdisciplinary      Outcomes       Date/Time User Outcome    10/25/24 0210 Jonh Jara RN Not Progressing            Goal: Participates in unit activities       Dates: Start:  10/25/24    Expected End:  10/31/24       Description: INTERVENTIONS:  1. Provide therapeutic environment  2. Provide required programming  3. Redirect inappropriate behaviors    Disciplines: Interdisciplinary      Outcomes       Date/Time User Outcome    10/25/24 0210 John Jara RN Not Progressing            Goal: Discusses signs/symptoms of illness/treatment options       Dates: Start:  10/25/24    Expected End:  10/31/24        Description: INTERVENTIONS:  1. Provide therapeutic environment  2. Complete daily flowsheet  3. Provide required programming    Disciplines: Interdisciplinary      Outcomes       Date/Time User Outcome    10/25/24 0210 John Jara RN Not Progressing            Goal: Initiates reality-based interactions       Dates: Start:  10/25/24    Expected End:  10/31/24       Description: INTERVENTIONS:  1. Redirect inappropriate behaviors  2. Provide reassurance and reality orientation    Disciplines: Interdisciplinary      Outcomes       Date/Time User Outcome    10/25/24 0210 John Jara RN Not Progressing            Goal: Able to discuss content of hallucinations/delusions       Dates: Start:  10/25/24    Expected End:  10/31/24       Disciplines: Interdisciplinary      Outcomes       Date/Time User Outcome    10/25/24 0210 John Jara RN Not Progressing            Goal: Notifies staff when experiencing hallucinations/delusions       Dates: Start:  10/25/24    Expected End:  10/31/24       Disciplines: Interdisciplinary      Outcomes       Date/Time User Outcome    10/25/24 0210 John Jara RN Not Progressing            Goal: Verbalizes reduction in hallucinations/delusions       Dates: Start:  10/25/24    Expected End:  10/31/24       Disciplines: Interdisciplinary      Outcomes       Date/Time User Outcome    10/25/24 0210 John Jara RN Not Progressing            Goal: Will not act on psychotic perception       Dates: Start:  10/25/24    Expected End:  10/31/24       Description: INTERVENTIONS:  1. Provide therapeutic environment  2. Redirect inappropriate behaviors  3. Provide behavioral interventions  4. Complete safety checks per protocol  5. Utilize least restrictive measures    Disciplines: Interdisciplinary      Outcomes       Date/Time User Outcome    10/25/24 0210 John Jara RN Not Progressing            Goal: Understands least restrictive measures       Dates: Start:   10/25/24    Expected End:  10/31/24       Description: INTERVENTIONS:  1. Utilize least restrictive measures    Disciplines: Interdisciplinary      Outcomes       Date/Time User Outcome    10/25/24 0210 John Jara RN Not Progressing            Goal: Free from restraint events       Dates: Start:  10/25/24    Expected End:  10/31/24       Description: INTERVENTIONS:  1. Utilize least restrictive measures  2. Provide behavioral interventions  3. Redirect inappropriate behaviors    Disciplines: Interdisciplinary      Outcomes       Date/Time User Outcome    10/25/24 0210 John Jara RN Not Progressing

## 2024-10-24 NOTE — ED PROVIDER NOTES
HPI   Chief Complaint   Patient presents with    Hallucinations     PD was called to patients house by patient and stated that she saw people that were not there. Patient is not aggressive but not cooperating to receive care. Patient is pink slipped.        Patient presents to the ED by EMS with police for psychiatric evaluation.  Patient called police 3 times with concern of people after her and hearing people in her home.  EMS notes patient was initially combative during assessment.  Patient states she heard people in her house and states people she knows are trying to manipulate her by putting things in her phone and her cameras.  States she was eating yelled at by her ex-spouse and was texting her boyfriend who was not getting her messages.  She denies any recent falls or head strikes, infectious symptoms or fever/chills, cardiopulmonary symptoms, GI/ symptoms.  No she is never been hospitalized for psychiatric health.  Denies any SI/HI or AVH.  Notes tobacco use and social EtOH use but denies any other significant substance use.  Denies any other significant systemic symptoms or complaints.      History provided by:  EMS personnel and police          Patient History   Past Medical History:   Diagnosis Date    Personal history of other diseases of the musculoskeletal system and connective tissue 09/13/2017    History of necrotizing fasciitis    Unspecified cirrhosis of liver (Multi)     Cirrhosis     Past Surgical History:   Procedure Laterality Date    OTHER SURGICAL HISTORY  03/12/2021    Gastric bypass surgery    OTHER SURGICAL HISTORY  03/12/2021    Heart surgery    OTHER SURGICAL HISTORY  03/12/2021    Exploratory laparotomy    US GUIDED ABDOMINAL PARACENTESIS  3/18/2022    US GUIDED ABDOMINAL PARACENTESIS 3/18/2022 [facility] CLINICAL LEGACY    US GUIDED ABDOMINAL PARACENTESIS  4/4/2022    US GUIDED ABDOMINAL PARACENTESIS 4/4/2022 [facility] CLINICAL LEGACY    US GUIDED ABDOMINAL PARACENTESIS  11/8/2021    US GUIDED  "ABDOMINAL PARACENTESIS [facility]    US GUIDED ABDOMINAL PARACENTESIS  10/17/2022    US GUIDED ABDOMINAL PARACENTESIS [facility] INPATIENT LEGACY     No family history on file.  Social History     Tobacco Use    Smoking status: Not on file    Smokeless tobacco: Not on file   Substance Use Topics    Alcohol use: Not on file    Drug use: Not on file       Physical Exam   /89   Pulse (!) 110   Ht 1.676 m (5' 6\")   Wt 90.7 kg (200 lb)   SpO2 98%   BMI 32.28 kg/m²       Physical Exam  Vitals and nursing note reviewed.   Constitutional:       General: She is not in acute distress.     Appearance: Normal appearance. She is normal weight. She is not ill-appearing.      Comments: Appears disheveled   HENT:      Head: Normocephalic and atraumatic.      Nose: Nose normal.      Mouth/Throat:      Mouth: Mucous membranes are moist.      Pharynx: Oropharynx is clear.   Eyes:      General: No scleral icterus.     Conjunctiva/sclera:      Right eye: Right conjunctiva is injected.      Left eye: Left conjunctiva is injected.      Pupils: Pupils are equal, round, and reactive to light.      Comments: Mild bilateral conjunctival injection   Cardiovascular:      Rate and Rhythm: Normal rate.   Pulmonary:      Effort: Pulmonary effort is normal.   Skin:     General: Skin is warm and dry.   Neurological:      General: No focal deficit present.      Mental Status: She is alert and oriented to person, place, and time.      Cranial Nerves: Cranial nerves 2-12 are intact.   Psychiatric:         Attention and Perception: Attention and perception normal. She does not perceive auditory or visual hallucinations.         Mood and Affect: Mood is depressed. Affect is labile, blunt, flat and angry.         Speech: Speech is tangential. Speech is not rapid and pressured.         Behavior: Behavior is uncooperative and aggressive.         Thought Content: Thought content is paranoid and delusional.         Judgment: Judgment is " impulsive and inappropriate.      Comments: Does not appear intoxicated, no appreciable internal stimuli, no obvious self injures behavior, impulsive and inappropriate judgment and behavior, paranoid delusional thought process, nonlinear/tangential thought process, no appreciable rapid/pressured speech, no significant insight to current psychiatric health, appreciable flat/blunted affect, labile and angry affect with appreciable depressive mood/state           ED Course & MDM   ED Course as of 10/24/24 0553   u Oct 24, 2024   0333 VS notable for tachycardia on presentation in setting of psychiatric evaluation, remaining VSS [BC]   0352 I personally reviewed and interpreted the EKG @0337: NSR 97, normal axis/intervals and no appreciable ischemia, and prior EKG on 3/17/2022 reviewed without any appreciable specific/identifiable changes [BC]   0426 CBC and Auto Differential(!)  Improved normocytic anemia otherwise unremarkable and noncontributory to Patient condition/symptoms [BC]   0455 Acute Toxicology Panel, Blood  Undetectable in the setting of psychiatric evaluation [BC]   0455 Comprehensive Metabolic Panel(!)  Baseline for patient, hyperglycemia without evidence of AGMA, otherwise unremarkable and noncontributory to Patient condition/symptoms [BC]      ED Course User Index  [BC] Dilan Ford MD         Diagnoses as of 10/24/24 0553   Paranoid delusion (Multi)   Bipolar 1 disorder (Multi)   Hallucinations                 No data recorded     [place] Coma Scale Score: 10 (10/24/24 0226 : Nova Giles, DINO)                           Medical Decision Making  Patient presented to the ED for theatric evaluation with concerning PMHx of bipolar 1, depression, HTN, DM2, GERD.  I personally reviewed and interpreted yes, labs, and EKG which are as stated above in the ED course.    Assessment/evaluation consistent with acute psychiatric decompensation with evidence of paranoid delusional thought process, likely  "medically unmanaged psychiatric health and potentially medication noncompliant.  No concerning history, clinical evidence/work-up, or exam findings for the considered differentials of traumatic/infectious encephalopathy, significant electrolyte/metabolic derangement/encephalopathy.  These conditions have been thoroughly evaluated and determined to be sufficiently unlikely to be the etiology of patient's presenting symptoms.      Patient signed out to oncoming provider, Dr. Figueroa Heard, at 5:59 AM in stable condition.    /89   Pulse (!) 110   Ht 1.676 m (5' 6\")   Wt 90.7 kg (200 lb)   SpO2 98%   BMI 32.28 kg/m²     Remaining workup:  Labs UA and Urine drug screen, Reassessment, and Recommendations EPAT (SW) ED    Patient disposition Psychiatric Admission and alternative disposition NONE.      Per Chart Review: Nothing pertinent to this ED encounter.      Parts of this chart have been completed using voice-to-text recognition software. Please excuse any errors of transcription that were missed for editing/correcting.    Problems Addressed:  Bipolar 1 disorder (Multi): acute illness or injury  Paranoid delusion (Multi): undiagnosed new problem with uncertain prognosis    Amount and/or Complexity of Data Reviewed  Labs: ordered. Decision-making details documented in ED Course.  ECG/medicine tests: ordered and independent interpretation performed. Decision-making details documented in ED Course.        Procedure  Procedures     Dilan Ford MD  10/24/24 2483    "

## 2024-10-24 NOTE — ED PROVIDER NOTES
I assumed care of this patient at shift change.  Patient accepted to [facility], accepting physician and EMTALA form.    Sections of this report were created using voice-to-text technology and may contain errors in translation    Leandro Jaimes DO  Emergency Medicine             Leandro Jaimes DO  10/24/24 5628

## 2024-10-24 NOTE — ED PROVIDER NOTES
Patient signed out to me by the overnight physician, this is a 49-year-old female who was brought to ED for visual and auditory hallucinations, paranoia stating that people are trying to manipulate her by putting things in her phone, her cameras, and making her see things.  She was initially agitated and medicated overnight, she was signed out to me pending urinalysis, urine drug screen, and final disposition.  Plan is for psychiatric assessment and possible inpatient psychiatric admission given her decompensated acute psychosis.    Patient is medically cleared for psychiatric evaluation and management.  Pregnancy is negative.  Patient will be referred for inpatient treatment given that she would likely benefit from this due to the severity of her symptoms.    Patient signed out to the oncoming physician pending placement.     Figueroa Heard,   10/24/24 0274

## 2024-10-25 VITALS
TEMPERATURE: 97.3 F | BODY MASS INDEX: 34.33 KG/M2 | OXYGEN SATURATION: 99 % | SYSTOLIC BLOOD PRESSURE: 111 MMHG | HEART RATE: 79 BPM | HEIGHT: 64 IN | RESPIRATION RATE: 18 BRPM | DIASTOLIC BLOOD PRESSURE: 74 MMHG

## 2024-10-25 PROBLEM — A59.03 TRICHOMONAL CYSTITIS: Status: ACTIVE | Noted: 2024-10-25

## 2024-10-25 PROBLEM — F90.8 OTHER SPECIFIED ATTENTION DEFICIT HYPERACTIVITY DISORDER (ADHD): Chronic | Status: ACTIVE | Noted: 2024-10-25

## 2024-10-25 PROBLEM — F15.10 METHAMPHETAMINE ABUSE (MULTI): Status: ACTIVE | Noted: 2024-10-25

## 2024-10-25 PROBLEM — F43.12 CHRONIC POSTTRAUMATIC STRESS DISORDER: Chronic | Status: ACTIVE | Noted: 2024-10-25

## 2024-10-25 PROBLEM — F31.9 BIPOLAR 1 DISORDER (MULTI): Chronic | Status: ACTIVE | Noted: 2024-10-25

## 2024-10-25 PROBLEM — F51.04 CHRONIC INSOMNIA: Chronic | Status: ACTIVE | Noted: 2024-10-25

## 2024-10-25 LAB
ATRIAL RATE: 97 BPM
P AXIS: 74 DEGREES
P OFFSET: 209 MS
P ONSET: 153 MS
PR INTERVAL: 130 MS
Q ONSET: 218 MS
QRS COUNT: 16 BEATS
QRS DURATION: 98 MS
QT INTERVAL: 338 MS
QTC CALCULATION(BAZETT): 429 MS
QTC FREDERICIA: 396 MS
R AXIS: 74 DEGREES
T AXIS: 40 DEGREES
T OFFSET: 387 MS
VENTRICULAR RATE: 97 BPM

## 2024-10-25 PROCEDURE — 2500000001 HC RX 250 WO HCPCS SELF ADMINISTERED DRUGS (ALT 637 FOR MEDICARE OP): Performed by: INTERNAL MEDICINE

## 2024-10-25 PROCEDURE — 2500000002 HC RX 250 W HCPCS SELF ADMINISTERED DRUGS (ALT 637 FOR MEDICARE OP, ALT 636 FOR OP/ED): Performed by: PSYCHIATRY & NEUROLOGY

## 2024-10-25 PROCEDURE — S4991 NICOTINE PATCH NONLEGEND: HCPCS | Performed by: PSYCHIATRY & NEUROLOGY

## 2024-10-25 PROCEDURE — 99236 HOSP IP/OBS SAME DATE HI 85: CPT | Performed by: PSYCHIATRY & NEUROLOGY

## 2024-10-25 RX ORDER — CEFUROXIME AXETIL 500 MG/1
500 TABLET ORAL 2 TIMES DAILY
Qty: 9 TABLET | Refills: 0 | Status: SHIPPED | OUTPATIENT
Start: 2024-10-25 | End: 2024-10-30

## 2024-10-25 RX ORDER — TRAZODONE HYDROCHLORIDE 50 MG/1
50 TABLET ORAL NIGHTLY
Qty: 30 TABLET | Refills: 0 | Status: SHIPPED | OUTPATIENT
Start: 2024-10-25 | End: 2024-11-24

## 2024-10-25 RX ORDER — TRAZODONE HYDROCHLORIDE 50 MG/1
50 TABLET ORAL NIGHTLY
Status: DISCONTINUED | OUTPATIENT
Start: 2024-10-25 | End: 2024-10-25 | Stop reason: HOSPADM

## 2024-10-25 SDOH — SOCIAL STABILITY: SOCIAL INSECURITY: WITHIN THE LAST YEAR, HAVE YOU BEEN AFRAID OF YOUR PARTNER OR EX-PARTNER?: PATIENT DECLINED

## 2024-10-25 SDOH — SOCIAL STABILITY: SOCIAL INSECURITY: DO YOU FEEL UNSAFE GOING BACK TO THE PLACE WHERE YOU ARE LIVING?: UNABLE TO ASSESS

## 2024-10-25 SDOH — SOCIAL STABILITY: SOCIAL INSECURITY: ARE THERE ANY APPARENT SIGNS OF INJURIES/BEHAVIORS THAT COULD BE RELATED TO ABUSE/NEGLECT?: UNABLE TO ASSESS

## 2024-10-25 SDOH — ECONOMIC STABILITY: HOUSING INSECURITY: IN THE PAST 12 MONTHS, HOW MANY TIMES HAVE YOU MOVED WHERE YOU WERE LIVING?: 2

## 2024-10-25 SDOH — ECONOMIC STABILITY: FOOD INSECURITY: WITHIN THE PAST 12 MONTHS, THE FOOD YOU BOUGHT JUST DIDN'T LAST AND YOU DIDN'T HAVE MONEY TO GET MORE.: PATIENT DECLINED

## 2024-10-25 SDOH — ECONOMIC STABILITY: HOUSING INSECURITY: IN THE LAST 12 MONTHS, WAS THERE A TIME WHEN YOU WERE NOT ABLE TO PAY THE MORTGAGE OR RENT ON TIME?: PATIENT DECLINED

## 2024-10-25 SDOH — SOCIAL STABILITY: SOCIAL INSECURITY: ABUSE: ADULT

## 2024-10-25 SDOH — SOCIAL STABILITY: SOCIAL INSECURITY
WITHIN THE LAST YEAR, HAVE YOU BEEN HUMILIATED OR EMOTIONALLY ABUSED IN OTHER WAYS BY YOUR PARTNER OR EX-PARTNER?: PATIENT DECLINED

## 2024-10-25 SDOH — ECONOMIC STABILITY: HOUSING INSECURITY: AT ANY TIME IN THE PAST 12 MONTHS, WERE YOU HOMELESS OR LIVING IN A SHELTER (INCLUDING NOW)?: PATIENT DECLINED

## 2024-10-25 SDOH — SOCIAL STABILITY: SOCIAL INSECURITY: ARE YOU OR HAVE YOU BEEN THREATENED OR ABUSED PHYSICALLY, EMOTIONALLY, OR SEXUALLY BY ANYONE?: UNABLE TO ASSESS

## 2024-10-25 SDOH — ECONOMIC STABILITY: FOOD INSECURITY
WITHIN THE PAST 12 MONTHS, YOU WORRIED THAT YOUR FOOD WOULD RUN OUT BEFORE YOU GOT THE MONEY TO BUY MORE.: PATIENT DECLINED

## 2024-10-25 SDOH — SOCIAL STABILITY: SOCIAL INSECURITY: POSSIBLE ABUSE REPORTED TO:: OTHER (COMMENT)

## 2024-10-25 SDOH — SOCIAL STABILITY: SOCIAL INSECURITY: HAVE YOU HAD ANY THOUGHTS OF HARMING ANYONE ELSE?: NO

## 2024-10-25 SDOH — SOCIAL STABILITY: SOCIAL NETWORK: HOW OFTEN DO YOU GET TOGETHER WITH FRIENDS OR RELATIVES?: PATIENT DECLINED

## 2024-10-25 SDOH — HEALTH STABILITY: MENTAL HEALTH: HOW OFTEN DO YOU HAVE SIX OR MORE DRINKS ON ONE OCCASION?: PATIENT DECLINED

## 2024-10-25 SDOH — HEALTH STABILITY: MENTAL HEALTH: EXPERIENCED ANY OF THE FOLLOWING LIFE EVENTS: OTHER (COMMENT)

## 2024-10-25 SDOH — SOCIAL STABILITY: SOCIAL INSECURITY: ARE YOU MARRIED, WIDOWED, DIVORCED, SEPARATED, NEVER MARRIED, OR LIVING WITH A PARTNER?: PATIENT DECLINED

## 2024-10-25 SDOH — SOCIAL STABILITY: SOCIAL INSECURITY: HAVE YOU HAD ANY THOUGHTS OF HARMING ANYONE ELSE?: UNABLE TO ASSESS

## 2024-10-25 SDOH — SOCIAL STABILITY: SOCIAL NETWORK: HOW OFTEN DO YOU ATTEND MEETINGS OF THE CLUBS OR ORGANIZATIONS YOU BELONG TO?: PATIENT DECLINED

## 2024-10-25 SDOH — ECONOMIC STABILITY: INCOME INSECURITY
IN THE PAST 12 MONTHS HAS THE ELECTRIC, GAS, OIL, OR WATER COMPANY THREATENED TO SHUT OFF SERVICES IN YOUR HOME?: PATIENT DECLINED

## 2024-10-25 SDOH — SOCIAL STABILITY: SOCIAL INSECURITY: DO YOU FEEL ANYONE HAS EXPLOITED OR TAKEN ADVANTAGE OF YOU FINANCIALLY OR OF YOUR PERSONAL PROPERTY?: UNABLE TO ASSESS

## 2024-10-25 SDOH — ECONOMIC STABILITY: FOOD INSECURITY: HOW HARD IS IT FOR YOU TO PAY FOR THE VERY BASICS LIKE FOOD, HOUSING, MEDICAL CARE, AND HEATING?: PATIENT DECLINED

## 2024-10-25 SDOH — HEALTH STABILITY: MENTAL HEALTH
EXPERIENCED ANY OF THE FOLLOWING LIFE EVENTS: DEATH OF FAMILY/FRIEND;SOCIAL LOSS (BANKRUPTCY, DIVORCE, WORK-RELATED STRESS)

## 2024-10-25 SDOH — SOCIAL STABILITY: SOCIAL INSECURITY: HAS ANYONE EVER THREATENED TO HURT YOUR FAMILY OR YOUR PETS?: NO

## 2024-10-25 SDOH — SOCIAL STABILITY: SOCIAL NETWORK
DO YOU BELONG TO ANY CLUBS OR ORGANIZATIONS SUCH AS CHURCH GROUPS, UNIONS, FRATERNAL OR ATHLETIC GROUPS, OR SCHOOL GROUPS?: PATIENT DECLINED

## 2024-10-25 SDOH — SOCIAL STABILITY: SOCIAL INSECURITY: DO YOU FEEL ANYONE HAS EXPLOITED OR TAKEN ADVANTAGE OF YOU FINANCIALLY OR OF YOUR PERSONAL PROPERTY?: NO

## 2024-10-25 SDOH — HEALTH STABILITY: MENTAL HEALTH: HOW MANY DRINKS CONTAINING ALCOHOL DO YOU HAVE ON A TYPICAL DAY WHEN YOU ARE DRINKING?: PATIENT DECLINED

## 2024-10-25 SDOH — HEALTH STABILITY: PHYSICAL HEALTH: ON AVERAGE, HOW MANY MINUTES DO YOU ENGAGE IN EXERCISE AT THIS LEVEL?: PATIENT DECLINED

## 2024-10-25 SDOH — HEALTH STABILITY: MENTAL HEALTH: HOW OFTEN DO YOU HAVE A DRINK CONTAINING ALCOHOL?: PATIENT DECLINED

## 2024-10-25 SDOH — SOCIAL STABILITY: SOCIAL INSECURITY: HAS ANYONE EVER THREATENED TO HURT YOUR FAMILY OR YOUR PETS?: UNABLE TO ASSESS

## 2024-10-25 SDOH — SOCIAL STABILITY: SOCIAL INSECURITY
WITHIN THE LAST YEAR, HAVE YOU BEEN RAPED OR FORCED TO HAVE ANY KIND OF SEXUAL ACTIVITY BY YOUR PARTNER OR EX-PARTNER?: PATIENT DECLINED

## 2024-10-25 SDOH — ECONOMIC STABILITY: TRANSPORTATION INSECURITY
IN THE PAST 12 MONTHS, HAS LACK OF TRANSPORTATION KEPT YOU FROM MEDICAL APPOINTMENTS OR FROM GETTING MEDICATIONS?: PATIENT DECLINED

## 2024-10-25 SDOH — HEALTH STABILITY: PHYSICAL HEALTH
HOW OFTEN DO YOU NEED TO HAVE SOMEONE HELP YOU WHEN YOU READ INSTRUCTIONS, PAMPHLETS, OR OTHER WRITTEN MATERIAL FROM YOUR DOCTOR OR PHARMACY?: PATIENT DECLINES TO RESPOND

## 2024-10-25 SDOH — SOCIAL STABILITY: SOCIAL INSECURITY: DOES ANYONE TRY TO KEEP YOU FROM HAVING/CONTACTING OTHER FRIENDS OR DOING THINGS OUTSIDE YOUR HOME?: UNABLE TO ASSESS

## 2024-10-25 SDOH — SOCIAL STABILITY: SOCIAL INSECURITY: HAVE YOU HAD THOUGHTS OF HARMING ANYONE ELSE?: UNABLE TO ASSESS

## 2024-10-25 SDOH — SOCIAL STABILITY: SOCIAL INSECURITY: ARE THERE ANY APPARENT SIGNS OF INJURIES/BEHAVIORS THAT COULD BE RELATED TO ABUSE/NEGLECT?: NO

## 2024-10-25 SDOH — SOCIAL STABILITY: SOCIAL INSECURITY: WERE YOU ABLE TO COMPLETE ALL THE BEHAVIORAL HEALTH SCREENINGS?: YES

## 2024-10-25 SDOH — SOCIAL STABILITY: SOCIAL INSECURITY: WERE YOU ABLE TO COMPLETE ALL THE BEHAVIORAL HEALTH SCREENINGS?: NO

## 2024-10-25 SDOH — SOCIAL STABILITY: SOCIAL INSECURITY
WITHIN THE LAST YEAR, HAVE YOU BEEN KICKED, HIT, SLAPPED, OR OTHERWISE PHYSICALLY HURT BY YOUR PARTNER OR EX-PARTNER?: PATIENT DECLINED

## 2024-10-25 SDOH — HEALTH STABILITY: PHYSICAL HEALTH
ON AVERAGE, HOW MANY DAYS PER WEEK DO YOU ENGAGE IN MODERATE TO STRENUOUS EXERCISE (LIKE A BRISK WALK)?: PATIENT DECLINED

## 2024-10-25 SDOH — SOCIAL STABILITY: SOCIAL INSECURITY: ARE YOU OR HAVE YOU BEEN THREATENED OR ABUSED PHYSICALLY, EMOTIONALLY, OR SEXUALLY BY ANYONE?: NO

## 2024-10-25 SDOH — SOCIAL STABILITY: SOCIAL INSECURITY: DOES ANYONE TRY TO KEEP YOU FROM HAVING/CONTACTING OTHER FRIENDS OR DOING THINGS OUTSIDE YOUR HOME?: NO

## 2024-10-25 SDOH — SOCIAL STABILITY: SOCIAL NETWORK: IN A TYPICAL WEEK, HOW MANY TIMES DO YOU TALK ON THE PHONE WITH FAMILY, FRIENDS, OR NEIGHBORS?: PATIENT DECLINED

## 2024-10-25 SDOH — HEALTH STABILITY: MENTAL HEALTH
DO YOU FEEL STRESS - TENSE, RESTLESS, NERVOUS, OR ANXIOUS, OR UNABLE TO SLEEP AT NIGHT BECAUSE YOUR MIND IS TROUBLED ALL THE TIME - THESE DAYS?: PATIENT DECLINED

## 2024-10-25 SDOH — SOCIAL STABILITY: SOCIAL NETWORK: HOW OFTEN DO YOU ATTEND CHURCH OR RELIGIOUS SERVICES?: PATIENT DECLINED

## 2024-10-25 ASSESSMENT — ACTIVITIES OF DAILY LIVING (ADL)
GROOMING: INDEPENDENT
PATIENT'S MEMORY ADEQUATE TO SAFELY COMPLETE DAILY ACTIVITIES?: YES
WALKS IN HOME: INDEPENDENT
ADEQUATE_TO_COMPLETE_ADL: YES
FEEDING YOURSELF: INDEPENDENT
DRESSING YOURSELF: INDEPENDENT
JUDGMENT_ADEQUATE_SAFELY_COMPLETE_DAILY_ACTIVITIES: NO
LACK_OF_TRANSPORTATION: NO
TOILETING: INDEPENDENT
HEARING - LEFT EAR: FUNCTIONAL
LACK_OF_TRANSPORTATION: PATIENT DECLINED
BATHING: INDEPENDENT
HEARING - RIGHT EAR: FUNCTIONAL

## 2024-10-25 ASSESSMENT — LIFESTYLE VARIABLES
AUDIT-C TOTAL SCORE: -1
AGITATION: SOMEWHAT MORE THAN NORMAL ACTIVITY
AUDIT-C TOTAL SCORE: -1
SKIP TO QUESTIONS 9-10: 0
CIWA TOTAL SCORE: 4
ORIENTATION AND CLOUDING OF SENSORIUM: ORIENTED AND CAN DO SERIAL ADDITIONS
PRESCIPTION_ABUSE_PAST_12_MONTHS: YES
HOW MANY STANDARD DRINKS CONTAINING ALCOHOL DO YOU HAVE ON A TYPICAL DAY: PATIENT DECLINED
TOTAL_SCORE: 0
SKIP TO QUESTIONS 9-10: 0
TREMOR: NO TREMOR
PRESCIPTION_ABUSE_PAST_12_MONTHS: YES
PAROXYSMAL SWEATS: NO SWEAT VISIBLE
AUDIT-C TOTAL SCORE: -1
HOW OFTEN DO YOU HAVE 6 OR MORE DRINKS ON ONE OCCASION: PATIENT DECLINED
HEADACHE, FULLNESS IN HEAD: NOT PRESENT
HOW OFTEN DO YOU HAVE A DRINK CONTAINING ALCOHOL: 4 OR MORE TIMES A WEEK
HOW OFTEN DO YOU HAVE A DRINK CONTAINING ALCOHOL: PATIENT DECLINED
SUBSTANCE_ABUSE_PAST_12_MONTHS: YES
ANXIETY: MILDLY ANXIOUS
SUBSTANCE_ABUSE_PAST_12_MONTHS: YES
NAUSEA AND VOMITING: NO NAUSEA AND NO VOMITING
VISUAL DISTURBANCES: NOT PRESENT
AUDITORY DISTURBANCES: MILD HARSHNESS OR ABILITY TO FRIGHTEN

## 2024-10-25 ASSESSMENT — PAIN - FUNCTIONAL ASSESSMENT
PAIN_FUNCTIONAL_ASSESSMENT: 0-10
PAIN_FUNCTIONAL_ASSESSMENT: 0-10

## 2024-10-25 ASSESSMENT — PAIN SCALES - GENERAL
PAINLEVEL_OUTOF10: 0 - NO PAIN
PAINLEVEL_OUTOF10: 0 - NO PAIN

## 2024-10-25 NOTE — NURSING NOTE
Patient is sitting at a table in the [facility] near the nurses station. Pt will not make eye contact and will not participate in answering any assessment/admission questions. Pt was offered food and drink.  Patient refusing admission vital signs and refusing admission weight. Patient randomly will get up and walk around the unit stating she hears her ex boyfriends voice.  Patient is malodorous and was offered to change in to a green elopement gown and was offered to shower. Pt refused.  is aware to see patient for admit h/p.

## 2024-10-25 NOTE — H&P
"      The reason for admission includes:  hearing sounds in the walls, seeing person in her closet who disppeared, increased paranoid feeling .  Onset of symptoms was abrupt starting 1 day ago with unchanged course since that time. Psychosocial Stressors: ptsd, amphetamine use, benzodiazapines in urine, chronic insomnia, not taking mental health medication since the summer.        History Of Present Illness  Meenu Chinchilla is a 49 y.o. female presenting with reported seeing a person in her closet, hearing scratching in walls and thinking someone entered the home, starting Wednesday night. Patient allowed collateral from her ex-, who was present on the unit today. Patient confirmed the story noted in epat, ex- confirmed patient's recount. Patient states she has ptsd from hospitals and emergency rooms from earlier memories of being in hospitals related her to sisters' cancer history and due to patient's own history of liver disease. She could not decide if she was hallucinating. This morning, patient, per ex- seemed back to baseline. Patient reported she never had SI or HI, has severe anxiety and does not feel safe in the hospital, or at home. Patient reported speed use that \"calms her mind\" for new dx of ADHD, per patient. She states she does not use benzodiazapine prescription (verifed on oarrs). She was surprised she was + for trich on urine, became tearful. She could not understand how she got a std, was tearful. States she has trauma from being at hospitals, did not wish to remain admitted or trial sleep aid. Ex - felt the patient was back to her baseline.  Patient state rennovation is being done, walls are thinner *per ex-. Patient reported seeing a person in the closet but story did not indicate it was a real person, likely image as she ended that comment and described others.  She also said she and ex had fostered 13 kid, she recently kicked out a friend of her daughter's " she was allowing to stay in the home, also another male stays in the home, she is selling the condo to.    Past Medical History  She has a past medical history of Personal history of other diseases of the musculoskeletal system and connective tissue (09/13/2017) and Unspecified cirrhosis of liver (Multi).    Past Psychiatric History:   Previous therapy: yes  Previous psychiatric treatment and medication trials: yes - wellbutrin, zoloft, gabapentin, aldactone  Previous psychiatric hospitalizations: no  Previous diagnoses: yes - bipolar, adhd, ptsd  Previous suicide attempts: no  History of violence: no  Currently in treatment with [facility], psychiatry and therapy.  Depression screening was performed with standardized tool: Yes - No Depression    Surgical History  She has a past surgical history that includes Other surgical history (03/12/2021); Other surgical history (03/12/2021); Other surgical history (03/12/2021); US guided abdominal paracentesis (3/18/2022); US guided abdominal paracentesis (4/4/2022); US guided abdominal paracentesis (11/8/2021); and US guided abdominal paracentesis (10/17/2022).     Social History  She reports that she has been smoking cigarettes. She started smoking about 9 months ago. She has a 0.8 pack-year smoking history. She has been exposed to tobacco smoke. She does not have any smokeless tobacco history on file. Alcohol use questions deferred to the physician. Drug use questions deferred to the physician. , current boyfriend, I daughter foster, recent gave birth, has sensory attachment issues     Allergies  Patient has no known allergies.    Review of Systems    Psychiatric ROS - Adult  Anxiety: Post Traumatic Stress Disorder (PTSD)PTSD: traumatic event, avoidance of stimuli associated with event, persistent symptoms of increased arousal, and hypervigilance  Depression: negative  Delirium: negative  Psychosis: auditory hallucinations, visual hallucinations  Bri:  "negative      Physical Exam    Involuntary Movement Assessment  Facial and Oral Movements   neg     Mental Status Exam  General: CF with chronic insomnia, ptsd, speed use to self medicate adhd, currently not prescribed adderall  Appearance: in gown, missing dentition, wearing blanket over shoulders   Attitude: forthcoming, offering information  Behavior: good eye contact, did not appear guarded, calm, engaged in the assessment  Motor Activity: no eps or td, gait was stable, no psychomotor agitation or retardation  Speech: clear, normal tone and volume, rate, fluent without impairment  Mood: denied depression, acute anxiety on being at the hospital, anxiety about someone breaking into her home  Affect: somewhat anxious in story telling  Thought Process: linear, well organized, goal directed  Thought Content: denied SI, denied HI, perseverates on seeing a person in her closet and noise in walls, states walls are being thinned out for rennovation  Thought Perception: stated she saw person in the closet but did not have a conversation with them (not believed to be true person, but image, hallucination)  Cognition: alert, oriented to person place, time, short and long term memory seem intact  Insight: partial  Judgement: asked for help    Psychiatric Risk Assessment  Violence Risk Assessment: none  Acute Risk of Harm to Others is Considered: low   Suicide Risk Assessment: , chronic medical illness, chronic pain, current psychiatric illness, and global insomnia  Protective Factors against Suicide: positive family relationships and sense of responsibility toward family  Acute Risk of Harm to Self is Considered: low    Last Recorded Vitals  Blood pressure 111/74, pulse 79, temperature 36.3 °C (97.3 °F), temperature source Temporal, resp. rate 18, height 1.626 m (5' 4\"), SpO2 99%.       Assessment/Plan   Assessment & Plan  Psychosis, paranoid (Multi)    Chronic insomnia    Bipolar 1 disorder (Multi)    Other " specified attention deficit hyperactivity disorder (ADHD)    Methamphetamine abuse (Multi)    Chronic posttraumatic stress disorder    Trichomonal cystitis    PLAN:  Patient with high anxiety, ptsd, adhd, chronic insomnia, unclear how much she obtained trichomonas, was benzo positive in urine (may be from er ) present with acute heightened bout of paranoia without associated depressive or manic symptoms. Suspect patient has had a bout of decreased sleep at home, with increased anxiety, possibly other unrevealed stressors at home, with meth use.  Patient stated meth calmed her adhd brain down, she recently was diagnosed with adhd. Has diagosis of bipolar, not currently on any medication follow with [facility]. I offered sleep aid, low dose seroquel and patient declined to trial medication in the hospital but agreed to trial a home, stated she did not wish to stay in the hospital, agreed to follow up [facility] op psychiatry.  Was deemed safe for dc home with ex-.        I spent 35 minutes in the professional and overall care of this patient.      Medication Consent  Medication Consent: no medication changes necessary for review    Norah Nava MD

## 2024-10-25 NOTE — NURSING NOTE
" attempted to assess patient and she did not cooperate. Patient was made aware that her urine is positive for trichomonas and she will need to take an oral antibiotic to treat. Pt declined acknowledging that this is true. Patient also has a long wig on and patient was informed that the wig needs to be removed (she stated she has mindy pins holding it in place). Pt refused to remove the wig willingly and the  police had to be called up to the unit for assist. It was explained to the patient that it is the protocol on this unit that wigs are not permitted unless the psychiatrist sees the patient in person and makes an exception. Wig was removed in the presence of the  police and it was inspected for contraband (negative). Wig was not returned to the patient at this time. Patient has multiple bruising on her bilateral arms from drug use. Patient was very angry when she first removed the wig. After a while she calmed down and was willing to talk about the \"STD\" and she started asking appropriate questions about transmission and treatment. Pt agreed to take the flagyl and ceftin.  "

## 2024-10-25 NOTE — CARE PLAN
The patient's goals for the shift include PATEL (PATEL/Pt refusing to answer)    The clinical goals for the shift include Meducation compliance/maintain patient safety    Over the shift, the patient did not make progress toward the following goals. Barriers to progression include refusing to answer questions or participate in plan of care.. Recommendations to address these barriers include crisis meds to manage symptoms until patient is well enough to agree to take oral medications willingly.    Problem: Fall/Injury  Goal: Not fall by end of shift  Outcome: Not Progressing  Goal: Be free from injury by end of the shift  Outcome: Not Progressing  Goal: Verbalize understanding of personal risk factors for fall in the hospital  Outcome: Not Progressing  Goal: Verbalize understanding of risk factor reduction measures to prevent injury from fall in the home  Outcome: Not Progressing  Goal: Pace activities to prevent fatigue by end of the shift  Outcome: Not Progressing     Problem: Sensory Perceptual Alteration as Evidenced by  Goal: Cooperates with admission process  Outcome: Not Progressing  Goal: Patient/Family participate in treatment and discharge plans  Outcome: Not Progressing  Goal: Patient/Family verbalizes awareness of resources  Outcome: Not Progressing  Goal: Participates in unit activities  Outcome: Not Progressing  Goal: Discusses signs/symptoms of illness/treatment options  Outcome: Not Progressing  Goal: Initiates reality-based interactions  Outcome: Not Progressing  Goal: Able to discuss content of hallucinations/delusions  Outcome: Not Progressing  Goal: Notifies staff when experiencing hallucinations/delusions  Outcome: Not Progressing  Goal: Verbalizes reduction in hallucinations/delusions  Outcome: Not Progressing  Goal: Will not act on psychotic perception  Outcome: Not Progressing  Goal: Understands least restrictive measures  Outcome: Not Progressing  Goal: Free from restraint events  Outcome: Not  Progressing     Problem: Altered Thought Processes as Evidenced by  Goal: STG - Desires improvement in ability to think and concentrate  Outcome: Not Progressing  Goal: STG - Participates in Occupational Therapy and other cognitive assessments  Outcome: Not Progressing     Problem: Potential for Harm to Self or Others  Goal: Cooperates with admission process  Outcome: Not Progressing  Goal: Participates in unit activities  Outcome: Not Progressing  Goal: Patient/Family participate in treatment and discharge plans  Outcome: Not Progressing  Goal: Identifies deescalation techniques  Outcome: Not Progressing  Goal: Understands least restrictive measures  Outcome: Not Progressing  Goal: Identifies stressors that lead to harmful behaviors  Outcome: Not Progressing  Goal: Notifies staff when experiencing harmful thoughts toward self/others  Outcome: Not Progressing  Goal: Denies harm toward self or others  Outcome: Not Progressing  Goal: Free from restraint events  Outcome: Not Progressing     Problem: Educational/Scholastic Disruption  Goal: Meets educational requirements during hospitalization  Outcome: Not Progressing  Goal: Attends class without disruptive behavior  Outcome: Not Progressing  Goal: Completes daily assignments  Outcome: Not Progressing     Problem: Ineffective Coping  Goal: Cooperates with admission process  Outcome: Not Progressing  Goal: Identifies ineffective coping skills  Outcome: Not Progressing  Goal: Identifies healthy coping skills  Outcome: Not Progressing  Goal: Demonstrates healthy coping skills  Outcome: Not Progressing  Goal: Participates in unit activities  Outcome: Not Progressing  Goal: Patient/Family participate in treatment and discharge plans  Outcome: Not Progressing  Goal: Patient/Family verbalizes awareness of resources  Outcome: Not Progressing  Goal: Understands least restrictive measures  Outcome: Not Progressing  Goal: Free from restraint events  Outcome: Not Progressing      Problem: Alteration in Sleep  Goal: STG - Reports nightly sleep, duration, and quality  Outcome: Not Progressing  Goal: STG - Identifies sleep hygiene aids  Outcome: Not Progressing  Goal: STG - Informs staff if unable to sleep  Outcome: Not Progressing  Goal: STG - Attends breathing and relaxation group  Outcome: Not Progressing     Problem: Potential for Substance Withdrawal  Goal: Verbalizes signs/symptoms of withdrawal  Outcome: Not Progressing  Goal: Reports signs/symptoms of withdrawal  Outcome: Not Progressing  Goal: Free of withdrawal symptoms  Outcome: Not Progressing     Problem: Anxiety  Goal: Patient/family understands admission protocols  Outcome: Not Progressing  Goal: Attempts to manage anxiety with help  Outcome: Not Progressing  Goal: Verbalizes ways to manage anxiety  Outcome: Not Progressing  Goal: Implements measures to reduce anxiety  Outcome: Not Progressing  Goal: Free from restraint events  Outcome: Not Progressing     Problem: Self Care Deficit  Goal: STG - Patient completes hygiene  Outcome: Not Progressing  Goal: Increase group attendance  Outcome: Not Progressing  Goal: Accepts need for medications  Outcome: Not Progressing  Goal: STG - Goes to and eats meals independently in dining room 100% of time  Outcome: Not Progressing     Problem: Defensive Coping  Goal: Cooperates with admission process  Outcome: Not Progressing  Goal: Identifies reckless/dangerous behavior  Outcome: Not Progressing  Goal: Identifies stressors that lead to reckless/dangerous behavior  Outcome: Not Progressing  Goal: Discusses and identifies healthy coping skills  Outcome: Not Progressing  Goal: Demonstrates healthy coping skills  Outcome: Not Progressing  Goal: Identifies appropriate social interaction  Outcome: Not Progressing  Goal: Demonstrates appropriate social interactions  Outcome: Not Progressing  Goal: Patient/Family verbalizes awareness of resources  Outcome: Not Progressing  Goal: Discusses  signs/symptoms of illness/treatment options  Outcome: Not Progressing  Goal: Patient/Family participate in treatment and discharge plans  Outcome: Not Progressing  Goal: Understands least restrictive measures  Outcome: Not Progressing  Goal: Free from restraint events  Outcome: Not Progressing

## 2024-10-25 NOTE — CARE PLAN
Problem: Community resource needs  Goal: Patient is receiving increased resource support to enhance ability to remain at home  Outcome: Progressing     Problem: Mental health issues  Goal: Stabilize adverse mental health factors affecting plan of care  Outcome: Progressing     Problem: Discharge Planning - Care Management  Goal: Discharge to post-acute care or home with appropriate resources  Outcome: Progressing

## 2024-10-25 NOTE — NURSING NOTE
Assessment was attempted on this patient at start of shift.  Patient refused to answer questions, refused medications and did not want to eat.  Several attempts made to talk with patient and patient continued to demand to leave.  Patient's ex- came during visitation and met with  and patient's provider.  Patient was later discharged from the unit.  All personal belongings were accounted for and returned.  Crisis hotlines and crisis resources were reviewed with patient. Patient exited the unit accompanied by her .

## 2024-10-25 NOTE — GROUP NOTE
Group Topic: Goals   Group Date: 10/25/2024  Start Time: 0730  End Time: 0810  Facilitators: ALEXANDRA Toth   Department: [facility] REHAB THERAPY VIRTUAL    Number of Participants: 5   Group Focus: goals, personal responsibility, and social skills  Treatment Modality: Recreational Therapy   Interventions utilized were SMART Goals, Relaxation Ball, exploration, patient education, and support  Purpose: self-worth, self-care, and other: personal goals     Name: Meenu Chinchilla YOB: 1975   MR: [id number]      Facilitator: Recreational Therapist  Level of Participation: did not attend  Progress: None  Comments: Patients were provided with a SMART Goals worksheet for morning goal session (specific, measurable, attainable, relevant, and timely). We discussed setting goals and challenging ourselves to make healthy lifestyle changes.    Patient declined invitation to group activity at this time. Patient will continue to be provided with opportunities to enhance leisure skills and/or coping mechanisms.    Plan: continue with services

## 2024-10-25 NOTE — NURSING NOTE
Patient is in the [facility] sitting at a table near the nurses station, calm and asleep. Pt was asked to let us get vital signs and a weight and she refused.
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
## 2024-10-25 NOTE — GROUP NOTE
"Group Topic: Leisure Skills   Group Date: 10/25/2024  Start Time: 1400  End Time: 1500  Facilitators: ALEXANDRA Toth   Department: [facility] REHAB THERAPY VIRTUAL    Number of Participants: 4   Group Focus: leisure skills and social skills  Treatment Modality: Recreational Therapy  Interventions utilized were Isac Attack, exploration, leisure development, and mental fitness  Purpose: other: cognitive skills/focus, leisure awareness, social engagement     Name: Meenu Chinchilla YOB: 1975   MR: [id number]      Facilitator: Recreational Therapist  Level of Participation: did not attend  Progress: None  Comments: Patients were gathered to learn and participate in the game \"Isac Attack\". This activity works on cognitive skills, following directions, positive social interaction/teamwork, and promotes leisure awareness.    Patient declined invitation to group activity at this time. Patient will continue to be provided with opportunities to enhance leisure skills and/or coping mechanisms.    Plan: continue with services      "

## 2024-10-25 NOTE — NURSING NOTE
Assessment of patient was attempted this morning at start of shift.  Patient was sitting at the table closest to nursing station in the same gown she arrived in from the ER.  Patient has been noncompliant with all attempts to assess, refusing medications and stating she does not belong here.  Patient's ex- came to the unit during visitation hour and met with  and provider for collateral on patient's well-being.  Patient was then discharged from the unit to return home.  Patient was given crisis hotline phone numbers and a list of resources if she desires/needs help or counseling.  Patient stated to this nurse that she has a counselor that she meets with once a week.  All belongings were accounted for and returned to patient.
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
## 2024-10-25 NOTE — GROUP NOTE
"Group Topic: Coping Skills   Group Date: 10/25/2024  Start Time: 0930  End Time: 1030  Facilitators: ALEXANDRA Toth   Department: [facility] REHAB THERAPY VIRTUAL    Number of Participants: 6   Group Focus: anxiety, coping skills, personal responsibility, psychiatric education, and social skills  Treatment Modality: Recreational Therapy   Interventions utilized were \"Coping with Stress\" worksheet, \"Stress Management: Coping with Stress\" handout, exploration, patient education, and support  Purpose: coping skills, insight or knowledge, and self-care    Name: Meenu Chinchilla YOB: 1975   MR: [id number]      Facilitator: Recreational Therapist  Level of Participation: did not attend  Progress: None  Comments: Patients were provided with the \"Coping with Stress\" worksheet which includes a variety of areas (things that make me feel stress, changes in my body, thoughts I have, things I do, and when I feel stress I cope by). We worked together as a group to brainstorm ideas/answers to those questions and patients were given an opportunity to share about personal stressors and situations. Patients were also provided with the \"Stress Management: Coping with Stress\" handout which includes further information and healthy mechanisms to to manage stress including \"9 Scientifically Proven Stress Management Coping Skills\".    Patient declined invitation to group activity at this time. Patient will continue to be provided with opportunities to enhance leisure skills and/or coping mechanisms.    Plan: continue with services      "

## 2024-10-26 NOTE — DISCHARGE SUMMARY
"   Discharge Summary      Reason For Admission: hearing sounds in the walls, seeing person in her closet who disppeared, increased paranoid feeling .  Onset of symptoms was abrupt starting 1 day ago with unchanged course since that time. Psychosocial Stressors: ptsd, amphetamine use, benzodiazapines in urine, chronic insomnia, not taking mental health medication since the summer  Discharge Destination: home    Discharge Diagnosis:  Bipolar by history  Chronic PTSD  Methamphetamine abuse  ADHD per patient  Psychosis, paranoid event  Fatty liver cirrhosis  History of bariatric surgery  Trichomonal cystitis    HISTORY OF PRESENT ILLNESS:      Hospital course:  Meenu Chinchilla is a 49 y.o. female presenting with reported seeing a person in her closet, hearing scratching in walls and thinking someone entered the home, starting Wednesday night. Patient allowed collateral from her ex-, who was present on the unit today. Patient confirmed the story noted in epat, ex- confirmed patient's recount. Patient states she has ptsd from hospitals and emergency rooms from earlier memories of being in hospitals related her to sisters' cancer history and due to patient's own history of liver disease. She could not decide if she was hallucinating. This morning, patient, per ex- seemed back to baseline. Patient reported she never had SI or HI, has severe anxiety and does not feel safe in the hospital, or at home. Patient reported speed use that \"calms her mind\" for new dx of ADHD, per patient. She states she does not use benzodiazapine prescription (verifed on oarrs). She was surprised she was + for trich on urine, became tearful. She could not understand how she got a std, was tearful. States she has trauma from being at hospitals, did not wish to remain admitted or trial sleep aid. Ex - felt the patient was back to her baseline.  Patient state rennovation is being done, walls are thinner *per ex-. " Patient reported seeing a person in the closet but story did not indicate it was a real person, likely image as she ended that comment and described others.  She also said she and ex had fostered 13 kid, she recently kicked out a friend of her daughter's she was allowing to stay in the home, also another male stays in the home, she is selling the condo to.    Assessment:  Based on patient's presentation, psychiatry history, I can not rule out patient had a micro psychosis triggered from chronic severe insomnia in the setting of high risk factor of bipolar disorder, methamphetamine use and other stressors.     The patient was seen by the team, which included the provider, nursing, occupational therapy and social work. Patient received education regarding their diagnosis and treatment plan. Patient was visible on the unit, medication compliant, cooperative  with care and help seeking. The patient attended group therapy, worked on individualized coping skills and was goal oriented to the future and to ongoing stabilization of their mental health needs.     MMSE:                                                                                                                       Mental Status Exam  General: CF with chronic insomnia, ptsd, speed use to self medicate adhd, currently not prescribed adderall  Appearance: in gown, missing dentition, wearing blanket over shoulders   Attitude: forthcoming, offering information  Behavior: good eye contact, did not appear guarded, calm, engaged in the assessment  Motor Activity: no eps or td, gait was stable, no psychomotor agitation or retardation  Speech: clear, normal tone and volume, rate, fluent without impairment  Mood: denied depression, acute anxiety on being at the hospital, anxiety about someone breaking into her home  Affect: somewhat anxious in story telling  Thought Process: linear, well organized, goal directed  Thought Content: denied SI, denied HI, perseverates  on seeing a person in her closet and noise in walls, states walls are being thinned out for rennovation  Thought Perception: stated she saw person in the closet but did not have a conversation with them (not believed to be true person, but image, hallucination)  Cognition: alert, oriented to person place, time, short and long term memory seem intact  Insight: partial  Judgement: asked for help  LABS   Electrocardiogram, 12-lead    Result Date: 10/25/2024  Normal sinus rhythm Possible Left atrial enlargement When compared with ECG of 17-MAR-2022 14:39, Atrial fibrillation is no longer Present Nonspecific T wave abnormality no longer evident in Anterolateral leads Confirmed by Abimael Palafox ([id number]) on 10/25/2024 9:12:40 AM    [id number]    FUNCTIONAL ESTIMATES  Estimate of Intelligence:   average   Estimate of Capacity for Activities of Daily Living:   independent       A safety risk assessment was completed on the day of discharge. The patient is judged a minimal suicide risk due to:  1) Access to guns, secured. Is not returning home tonight  2) Denied prior suicide attempts.  3) Denies current suicidal ideation or plans  4) Goal directed to the future: follow up with therapy  5) No current symptoms of a major depressive episode.  The team deemed the patient to be a low risk of self harm and recommend the patient for discharge today.    Substance Use Risk Assessment:  Street Drugs: Street drug use was addressed on admission, including both physical, mental, financial and psychological risk factors of ongoing use. There are no FDA prescribed treatment medications for cannabis, stimulants use/abuse (cocaine, PCP) or hallucinogens.  Patient was screened for concomitant other drugs used (tobacco, alcohol). Treatment options available were discussed ( if applicable) AA, HALLIE, ELEONORA, and outpatient dual diagnosis therapy, treatment programs. Patient voiced understanding of their treatment options.    I spent over 30 minutes in  the preparation of this summary. All 11 elements of the transition record were discussed with the patient and or caregiver and the receiving inpatient facility (if applicable).  A copy of the transition record was given to the patient and was transmitted to the outpatient provider accepting the patient's care following  discharge.    Patient's illness, medication/potential for medication side effects, and the medication recommended along with the importance of mediation compliance benefits and risk were reviewed prior to discharge with the patient and with designated family member patient (if applicable).  The patient voiced understanding of their diagnosis and treatment plan.  The patient was counseled not to stop medications without the supervision of a psychiatrist.  The patient was counseled to follow-up with their outpatient medical provider as indicated.   The patient was counseled that if there was an increase in mental health issues, depression, anxiety, medication side effects, self harming thoughts or thoughts to harm others, to call [facility] Crisis or 911 and come to the nearest emergency room.   The patient also received information regarding advanced mental and medical health directives during this hospitalization which they could discuss with their outpatient provider.   The plan was discussed with the patient, the nurse and the social work department. The patient voiced understanding and agreement with the plan.     these medications from [facility]pharmacy #[id number] [place], [place] - [address] AT [facility]  cefuroxime  traZODone   Medications on Discharge:   trazodone 50mg at bedtime  cefuroxime  Patient reported not taking these medication at home:       buPROPion 100 mg tablet (Wellbutrin)   gabapentin 300 mg capsule (Neurontin)   sertraline 50 mg tablet (Zoloft)   Follow up appointments:    Follow up with Sha Mixon [address]  [facility] [address] [phone]      Community Resources  Crisis Center Hotline:  National Suicide  & Crisis Prevention Lifeline: Call or Text 525 or 1-177.678.4694 (TALK)  Crisis Text Line: Text HOME to 688440          Norah Nava MD

## 2024-10-27 LAB — BACTERIA UR CULT: ABNORMAL

## 2024-10-28 NOTE — SIGNIFICANT EVENT
Follow Up Phone Call    Outgoing phone call    Spoke to: Meenu KELTON Chinchilla Relationship:self   Phone number: [phone]      Outcome: contacted patient/ family   No chief complaint on file.         Diagnosis:Not applicable    States she is feeling better. Denies suicidal ideation at this time. Has emergency contact numbers on her person. Encouraged compliance with medications and appointments. Voiced understanding. No further questions or concerns.

## 2025-01-27 ENCOUNTER — HOSPITAL ENCOUNTER (EMERGENCY)
Facility: HOSPITAL | Age: 50
Discharge: LEFT BEFORE DISCHARGE | End: 2025-01-27
Payer: COMMERCIAL

## 2025-01-27 PROCEDURE — 4500999001 HC ED NO CHARGE

## 2025-01-28 NOTE — PROGRESS NOTES
Social Work Note  Pt is a 50 y/o F presents to [place] ED. Social work was asked to speak to patient in the lobby as pt was brought in by police for suicidal ideations and a SANE exam. Pt was not pink slipped and came in voluntarily. Pt then was refusing treatment. Social work and Jh CARRION met with pt in the lobby. She denies any suicidal ideations. She is not presenting with any overt delusions. In a record review she was admitted to [facility]  in October for paranoid delusions. Today pt states she came in by uber and then states the chief of [facility] police Ultimately pt states she does not want to speak to social work and does not want to stay in ED. She claims she has a friend to pick her up. She states she feels safe leaving. Based on my short interaction with her she is not voicing anything causing concern for her to harm herself or anyone else. Pt was allowed to leave without further treatment.   Pt then left  ED in someone elses uber.   PATEL signing off

## 2025-01-28 NOTE — ED TRIAGE NOTES
Patient was brought here by detectives, when patient was in triage, patient was asked what was going on patient stated that she did not want to tell me what was going on and she was leaving, I explained to patient I am unable to help her if she can't tell me what's going on she stated she wanted to speak to a nurse, I told her I am a nurse and she got to leave, I asked patient if she would at least let me take her vitals and she said no she just wanted to leave, patient sat in [facility] where I had social work (antonio) and Jh CARRION speak to patient in [facility], Antonio stated she didn't require a pink slip and didn't lack the capacity to make her own decision so she was able to refuse to be seen